# Patient Record
Sex: FEMALE | Race: WHITE | URBAN - METROPOLITAN AREA
[De-identification: names, ages, dates, MRNs, and addresses within clinical notes are randomized per-mention and may not be internally consistent; named-entity substitution may affect disease eponyms.]

---

## 2019-10-22 ENCOUNTER — COMPLETE SKIN EXAM (OUTPATIENT)
Dept: URBAN - METROPOLITAN AREA CLINIC 14 | Facility: CLINIC | Age: 69
Setting detail: DERMATOLOGY
End: 2019-10-22

## 2019-10-22 DIAGNOSIS — L82.1 OTHER SEBORRHEIC KERATOSIS: ICD-10-CM

## 2019-10-22 DIAGNOSIS — D04.39 CARCINOMA IN SITU OF SKIN OF OTHER PARTS OF FACE: ICD-10-CM

## 2019-10-22 PROCEDURE — 11102 TANGNTL BX SKIN SINGLE LES: CPT

## 2019-10-22 PROCEDURE — 99203 OFFICE O/P NEW LOW 30 MIN: CPT

## 2019-11-05 ENCOUNTER — MOHS CONSULT (OUTPATIENT)
Dept: URBAN - METROPOLITAN AREA CLINIC 14 | Facility: CLINIC | Age: 69
Setting detail: DERMATOLOGY
End: 2019-11-05

## 2019-11-05 DIAGNOSIS — Z08 ENCOUNTER FOR FOLLOW-UP EXAMINATION AFTER COMPLETED TREATMENT FOR MALIGNANT NEOPLASM: ICD-10-CM

## 2019-11-05 PROCEDURE — 99213 OFFICE O/P EST LOW 20 MIN: CPT

## 2019-12-10 ENCOUNTER — MOHS SURGERY-ROUTINE (OUTPATIENT)
Dept: URBAN - METROPOLITAN AREA CLINIC 14 | Facility: CLINIC | Age: 69
Setting detail: DERMATOLOGY
End: 2019-12-10

## 2019-12-10 DIAGNOSIS — L57.0 ACTINIC KERATOSIS: ICD-10-CM

## 2019-12-10 PROCEDURE — 13132 CMPLX RPR F/C/C/M/N/AX/G/H/F: CPT

## 2019-12-10 PROCEDURE — 17311 MOHS 1 STAGE H/N/HF/G: CPT

## 2022-08-03 ENCOUNTER — TELEPHONE (OUTPATIENT)
Dept: TRANSPLANT | Facility: CLINIC | Age: 72
End: 2022-08-03

## 2022-08-03 NOTE — TELEPHONE ENCOUNTER
Received call from patients daughter Judson seeking appt for liver transplant. She stated pt lives in [place] but will be living in  with her as she needs care. Daughter has a provider in [place] she still considers her (daughter) doctor and paid for mother to be seen there.  Pt travelled to [place] to see this doctor who then referred her to us. He only saw the pt once and at the request of the daughter.  He did not have any medical records to send, only stating the daughter has all the records. Per daughter pt with Hx of hep c. She reports seen by [facility] <10 yrs ago. Stated mother was not getting good care in [place]. She is bringing her to live with her in .      Review of records with [facility] records [id number]. Alcohol abuse documented during txp evaluation there. 1/2017 seen and it is documented: Patient seeking  services today for symptoms of withdrawal from detox and pain medications.     Pending additional information from pts daughter

## 2022-08-04 ENCOUNTER — HOSPITAL ENCOUNTER (OUTPATIENT)
Facility: OTHER | Age: 72
Discharge: HOME | End: 2022-08-06
Attending: EMERGENCY MEDICINE | Admitting: EMERGENCY MEDICINE
Payer: MEDICARE

## 2022-08-04 DIAGNOSIS — R07.9 CHEST PAIN: ICD-10-CM

## 2022-08-04 DIAGNOSIS — R06.02 SHORTNESS OF BREATH: ICD-10-CM

## 2022-08-04 DIAGNOSIS — B19.20 HEPATITIS C VIRUS INFECTION WITHOUT HEPATIC COMA, UNSPECIFIED CHRONICITY: ICD-10-CM

## 2022-08-04 DIAGNOSIS — K74.60 HEPATIC CIRRHOSIS, UNSPECIFIED HEPATIC CIRRHOSIS TYPE, UNSPECIFIED WHETHER ASCITES PRESENT: ICD-10-CM

## 2022-08-04 DIAGNOSIS — I63.9 STROKE: ICD-10-CM

## 2022-08-04 DIAGNOSIS — R41.82 ALTERED MENTAL STATUS: Primary | ICD-10-CM

## 2022-08-04 DIAGNOSIS — K44.9 HIATAL HERNIA: ICD-10-CM

## 2022-08-04 LAB
ALBUMIN SERPL BCP-MCNC: 3.2 G/DL (ref 3.5–5.2)
ALP SERPL-CCNC: 93 U/L (ref 55–135)
ALT SERPL W/O P-5'-P-CCNC: 22 U/L (ref 10–44)
AMMONIA PLAS-SCNC: 27 UMOL/L (ref 10–50)
AMPHET+METHAMPHET UR QL: ABNORMAL
ANION GAP SERPL CALC-SCNC: 8 MMOL/L (ref 8–16)
APTT BLDCRRT: 24.8 SEC (ref 21–32)
AST SERPL-CCNC: 34 U/L (ref 10–40)
BARBITURATES UR QL SCN>200 NG/ML: NEGATIVE
BASOPHILS # BLD AUTO: 0.03 K/UL (ref 0–0.2)
BASOPHILS NFR BLD: 0.6 % (ref 0–1.9)
BENZODIAZ UR QL SCN>200 NG/ML: NEGATIVE
BILIRUB SERPL-MCNC: 1.2 MG/DL (ref 0.1–1)
BILIRUB UR QL STRIP: NEGATIVE
BNP SERPL-MCNC: 13 PG/ML (ref 0–99)
BUN SERPL-MCNC: 17 MG/DL (ref 8–23)
BZE UR QL SCN: NEGATIVE
CALCIUM SERPL-MCNC: 8.7 MG/DL (ref 8.7–10.5)
CANNABINOIDS UR QL SCN: ABNORMAL
CHLORIDE SERPL-SCNC: 105 MMOL/L (ref 95–110)
CLARITY UR: CLEAR
CO2 SERPL-SCNC: 26 MMOL/L (ref 23–29)
COLOR UR: YELLOW
CREAT SERPL-MCNC: 0.9 MG/DL (ref 0.5–1.4)
CREAT SERPL-MCNC: 1 MG/DL (ref 0.5–1.4)
CREAT UR-MCNC: 105.3 MG/DL (ref 15–325)
CTP QC/QA: YES
D DIMER PPP IA.FEU-MCNC: 1.61 MG/L FEU
DIFFERENTIAL METHOD: ABNORMAL
EOSINOPHIL # BLD AUTO: 0.1 K/UL (ref 0–0.5)
EOSINOPHIL NFR BLD: 1.3 % (ref 0–8)
ERYTHROCYTE [DISTWIDTH] IN BLOOD BY AUTOMATED COUNT: 18.8 % (ref 11.5–14.5)
EST. GFR  (NO RACE VARIABLE): >60 ML/MIN/1.73 M^2
GLUCOSE SERPL-MCNC: 157 MG/DL (ref 70–110)
GLUCOSE UR QL STRIP: NEGATIVE
HCT VFR BLD AUTO: 34.1 % (ref 37–48.5)
HGB BLD-MCNC: 10.1 G/DL (ref 12–16)
HGB UR QL STRIP: NEGATIVE
IMM GRANULOCYTES # BLD AUTO: 0.02 K/UL (ref 0–0.04)
IMM GRANULOCYTES NFR BLD AUTO: 0.4 % (ref 0–0.5)
INR PPP: 1.2 (ref 0.8–1.2)
INR PPP: 1.2 (ref 0.8–1.2)
KETONES UR QL STRIP: NEGATIVE
LEUKOCYTE ESTERASE UR QL STRIP: NEGATIVE
LYMPHOCYTES # BLD AUTO: 1.1 K/UL (ref 1–4.8)
LYMPHOCYTES NFR BLD: 21.3 % (ref 18–48)
MCH RBC QN AUTO: 24.2 PG (ref 27–31)
MCHC RBC AUTO-ENTMCNC: 29.6 G/DL (ref 32–36)
MCV RBC AUTO: 82 FL (ref 82–98)
METHADONE UR QL SCN>300 NG/ML: NEGATIVE
MONOCYTES # BLD AUTO: 0.4 K/UL (ref 0.3–1)
MONOCYTES NFR BLD: 7.2 % (ref 4–15)
NEUTROPHILS # BLD AUTO: 3.7 K/UL (ref 1.8–7.7)
NEUTROPHILS NFR BLD: 69.2 % (ref 38–73)
NITRITE UR QL STRIP: NEGATIVE
NRBC BLD-RTO: 0 /100 WBC
OPIATES UR QL SCN: ABNORMAL
PCP UR QL SCN>25 NG/ML: NEGATIVE
PH UR STRIP: 8 [PH] (ref 5–8)
PLATELET # BLD AUTO: 93 K/UL (ref 150–450)
PMV BLD AUTO: 10.6 FL (ref 9.2–12.9)
POC PTINR: 1 (ref 0.9–1.2)
POC PTWBT: 12.4 SEC (ref 9.7–14.3)
POTASSIUM SERPL-SCNC: 5.1 MMOL/L (ref 3.5–5.1)
PROT SERPL-MCNC: 6.3 G/DL (ref 6–8.4)
PROT UR QL STRIP: NEGATIVE
PROTHROMBIN TIME: 12.7 SEC (ref 9–12.5)
PROTHROMBIN TIME: 12.8 SEC (ref 9–12.5)
RBC # BLD AUTO: 4.18 M/UL (ref 4–5.4)
SAMPLE: NORMAL
SAMPLE: NORMAL
SARS-COV-2 RDRP RESP QL NAA+PROBE: NEGATIVE
SODIUM SERPL-SCNC: 139 MMOL/L (ref 136–145)
SP GR UR STRIP: <=1.005 (ref 1–1.03)
TOXICOLOGY INFORMATION: ABNORMAL
TROPONIN I SERPL DL<=0.01 NG/ML-MCNC: <0.006 NG/ML (ref 0–0.03)
TROPONIN I SERPL DL<=0.01 NG/ML-MCNC: <0.006 NG/ML (ref 0–0.03)
TSH SERPL DL<=0.005 MIU/L-ACNC: 0.81 UIU/ML (ref 0.4–4)
URN SPEC COLLECT METH UR: ABNORMAL
UROBILINOGEN UR STRIP-ACNC: NEGATIVE EU/DL
WBC # BLD AUTO: 5.3 K/UL (ref 3.9–12.7)

## 2022-08-04 PROCEDURE — 84443 ASSAY THYROID STIM HORMONE: CPT | Performed by: NURSE PRACTITIONER

## 2022-08-04 PROCEDURE — 93010 EKG 12-LEAD: ICD-10-PCS | Mod: ,,, | Performed by: INTERNAL MEDICINE

## 2022-08-04 PROCEDURE — 85025 COMPLETE CBC W/AUTO DIFF WBC: CPT | Performed by: NURSE PRACTITIONER

## 2022-08-04 PROCEDURE — 81003 URINALYSIS AUTO W/O SCOPE: CPT | Mod: 59 | Performed by: NURSE PRACTITIONER

## 2022-08-04 PROCEDURE — 85730 THROMBOPLASTIN TIME PARTIAL: CPT | Performed by: NURSE PRACTITIONER

## 2022-08-04 PROCEDURE — 93010 ELECTROCARDIOGRAM REPORT: CPT | Mod: ,,, | Performed by: INTERNAL MEDICINE

## 2022-08-04 PROCEDURE — 99285 EMERGENCY DEPT VISIT HI MDM: CPT | Mod: 25,CS

## 2022-08-04 PROCEDURE — 82565 ASSAY OF CREATININE: CPT

## 2022-08-04 PROCEDURE — U0002 COVID-19 LAB TEST NON-CDC: HCPCS | Performed by: NURSE PRACTITIONER

## 2022-08-04 PROCEDURE — 80061 LIPID PANEL: CPT | Performed by: NURSE PRACTITIONER

## 2022-08-04 PROCEDURE — 85610 PROTHROMBIN TIME: CPT | Mod: 59

## 2022-08-04 PROCEDURE — G0378 HOSPITAL OBSERVATION PER HR: HCPCS

## 2022-08-04 PROCEDURE — 82962 GLUCOSE BLOOD TEST: CPT

## 2022-08-04 PROCEDURE — 83880 ASSAY OF NATRIURETIC PEPTIDE: CPT | Performed by: NURSE PRACTITIONER

## 2022-08-04 PROCEDURE — 96361 HYDRATE IV INFUSION ADD-ON: CPT

## 2022-08-04 PROCEDURE — 93005 ELECTROCARDIOGRAM TRACING: CPT

## 2022-08-04 PROCEDURE — 36415 COLL VENOUS BLD VENIPUNCTURE: CPT | Performed by: NURSE PRACTITIONER

## 2022-08-04 PROCEDURE — 80053 COMPREHEN METABOLIC PANEL: CPT | Performed by: NURSE PRACTITIONER

## 2022-08-04 PROCEDURE — 99900035 HC TECH TIME PER 15 MIN (STAT)

## 2022-08-04 PROCEDURE — 82140 ASSAY OF AMMONIA: CPT | Performed by: NURSE PRACTITIONER

## 2022-08-04 PROCEDURE — 25500020 PHARM REV CODE 255: Performed by: EMERGENCY MEDICINE

## 2022-08-04 PROCEDURE — G0425 PR INPT TELEHEALTH CONSULT 30M: ICD-10-PCS | Mod: 95,ICN,CMP, | Performed by: PSYCHIATRY & NEUROLOGY

## 2022-08-04 PROCEDURE — 85610 PROTHROMBIN TIME: CPT | Mod: 91 | Performed by: NURSE PRACTITIONER

## 2022-08-04 PROCEDURE — 85379 FIBRIN DEGRADATION QUANT: CPT | Performed by: NURSE PRACTITIONER

## 2022-08-04 PROCEDURE — G0425 INPT/ED TELECONSULT30: HCPCS | Mod: 95,ICN,CMP, | Performed by: PSYCHIATRY & NEUROLOGY

## 2022-08-04 PROCEDURE — 80307 DRUG TEST PRSMV CHEM ANLYZR: CPT | Performed by: NURSE PRACTITIONER

## 2022-08-04 PROCEDURE — 25000003 PHARM REV CODE 250: Performed by: NURSE PRACTITIONER

## 2022-08-04 PROCEDURE — 84484 ASSAY OF TROPONIN QUANT: CPT | Performed by: NURSE PRACTITIONER

## 2022-08-04 RX ORDER — MAG HYDROX/ALUMINUM HYD/SIMETH 200-200-20
30 SUSPENSION, ORAL (FINAL DOSE FORM) ORAL ONCE
Status: COMPLETED | OUTPATIENT
Start: 2022-08-04 | End: 2022-08-04

## 2022-08-04 RX ORDER — LIDOCAINE HYDROCHLORIDE 20 MG/ML
10 SOLUTION OROPHARYNGEAL ONCE
Status: COMPLETED | OUTPATIENT
Start: 2022-08-04 | End: 2022-08-04

## 2022-08-04 RX ORDER — HEPARIN SODIUM,PORCINE/D5W 25000/250
0-40 INTRAVENOUS SOLUTION INTRAVENOUS CONTINUOUS
Status: DISCONTINUED | OUTPATIENT
Start: 2022-08-04 | End: 2022-08-05

## 2022-08-04 RX ORDER — ASPIRIN 325 MG
325 TABLET ORAL
Status: COMPLETED | OUTPATIENT
Start: 2022-08-04 | End: 2022-08-04

## 2022-08-04 RX ADMIN — LIDOCAINE HYDROCHLORIDE 10 ML: 20 SOLUTION ORAL; TOPICAL at 10:08

## 2022-08-04 RX ADMIN — IOHEXOL 100 ML: 350 INJECTION, SOLUTION INTRAVENOUS at 07:08

## 2022-08-04 RX ADMIN — ASPIRIN 325 MG ORAL TABLET 325 MG: 325 PILL ORAL at 08:08

## 2022-08-04 RX ADMIN — ALUMINUM HYDROXIDE, MAGNESIUM HYDROXIDE, DIMETHICONE 30 ML: 200; 200; 20 SUSPENSION ORAL at 10:08

## 2022-08-04 RX ADMIN — SODIUM CHLORIDE 1000 ML: 0.9 INJECTION, SOLUTION INTRAVENOUS at 10:08

## 2022-08-04 NOTE — ED PROVIDER NOTES
Encounter Date: 8/4/2022       History     Chief Complaint   Patient presents with    Altered Mental Status     Family member states pt is altered. Hx of liver diease. Pt was at [facility] yesterday for acid reflex and was told it is most likely a hiatal hernia. Family member states pt has been tired and weak today but within the last 1-2 hrs pt became confused. Clear speech and speaking in full sentences. Pt is disoriented to month but able to answer other questions appropriately. Slow to respond appears confused. No droop noted. Normal strength to extremities x 4.     Weakness      Pt appears lethargic. Family member states pt has a prescription for trazodone and valium. Pt does know remember what pills she took.    Chest Pain    Shortness of Breath    Headache     Pt c/o posterior headache.       Chief complaint:  Altered mental status    72-year-old female presents for evaluation of altered mental status.  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  Patient is currently staying with her daughter.  Daughter reports that at approximately 4:00 p.m., acute onset of altered mental status with slurred speech.  Generalized weakness and fatigue.  No facial droop.  No unilateral weakness.  Patient continues to exhibit confusion with slurred speech.  Family members concerned that she may have accidentally taken prescription medication that can contribute to her symptoms, but patient denies taking any medications at the time of acute onset of AMS.    Additionally, patient reports continued chest pain, shortness of breath, upper abdominal pain since last night.  She was seen at outside facility and diagnosed with GERD with a hiatal hernia.    This is the extent of patient's complaints for this ER encounter.     The history is provided by the patient and a relative.     Review of patient's allergies indicates:   Allergen Reactions    Compazine [prochlorperazine] Nausea And  Vomiting     No past medical history on file.  No past surgical history on file.  No family history on file.     Review of Systems   Constitutional: Positive for fatigue. Negative for fever.   HENT: Negative for congestion and sore throat.    Respiratory: Positive for cough and shortness of breath.    Cardiovascular: Positive for chest pain.   Gastrointestinal: Positive for abdominal pain. Negative for diarrhea, nausea and vomiting.   Genitourinary: Negative for difficulty urinating and dysuria.   Musculoskeletal: Negative for arthralgias, back pain, myalgias and neck pain.   Skin: Negative for rash and wound.   Neurological: Positive for speech difficulty, weakness and headaches. Negative for facial asymmetry.   Psychiatric/Behavioral: Negative.        Physical Exam     Initial Vitals [08/04/22 1808]   BP Pulse Resp Temp SpO2   111/65 (!) 119 18 99 °F (37.2 °C) 95 %      MAP       --         Physical Exam    Vitals reviewed.  Constitutional: No distress.   HENT:   Head: Normocephalic and atraumatic.   Nose: Nose normal.   Mouth/Throat: Oropharynx is clear and moist and mucous membranes are normal.   Eyes: Conjunctivae, EOM and lids are normal. Right pupil is round. Left pupil is round. Pupils are equal.   Cardiovascular: Regular rhythm and normal heart sounds. Tachycardia present.    Pulmonary/Chest: Effort normal and breath sounds normal. No respiratory distress.   Musculoskeletal:         General: Normal range of motion.     Neurological: She is alert. She has normal strength. No sensory deficit. GCS eye subscore is 4. GCS verbal subscore is 4. GCS motor subscore is 6.   Skin: Skin is warm and dry. No rash noted.   Psychiatric: Her speech is slurred.         ED Course   Procedures  Labs Reviewed   CBC W/ AUTO DIFFERENTIAL - Abnormal; Notable for the following components:       Result Value    Hemoglobin 10.1 (*)     Hematocrit 34.1 (*)     MCH 24.2 (*)     MCHC 29.6 (*)     RDW 18.8 (*)     Platelets 93 (*)      All other components within normal limits   COMPREHENSIVE METABOLIC PANEL - Abnormal; Notable for the following components:    Glucose 157 (*)     Albumin 3.2 (*)     Total Bilirubin 1.2 (*)     All other components within normal limits   PROTIME-INR - Abnormal; Notable for the following components:    Prothrombin Time 12.8 (*)     All other components within normal limits   TSH   TROPONIN I   B-TYPE NATRIURETIC PEPTIDE   AMMONIA   LIPID PANEL   URINALYSIS, REFLEX TO URINE CULTURE   DRUG SCREEN PANEL, URINE EMERGENCY   D DIMER, QUANTITATIVE   TROPONIN I   SARS-COV-2 RDRP GENE    Narrative:     This test utilizes isothermal nucleic acid amplification   technology to detect the SARS-CoV-2 RdRp nucleic acid segment.   The analytical sensitivity (limit of detection) is 125 genome   equivalents/mL.   A POSITIVE result implies infection with the SARS-CoV-2 virus;   the patient is presumed to be contagious.     A NEGATIVE result means that SARS-CoV-2 nucleic acids are not   present above the limit of detection. A NEGATIVE result should be   treated as presumptive. It does not rule out the possibility of   COVID-19 and should not be the sole basis for treatment decisions.   If COVID-19 is strongly suspected based on clinical and exposure   history, re-testing using an alternate molecular assay should be   considered.   This test is only for use under the Food and Drug   Administration s Emergency Use Authorization (EUA).   Commercial kits are provided by [employer].   Performance characteristics of the EUA have been independently   verified by [facility] Department of   Pathology and Laboratory Medicine.   _________________________________________________________________   The authorized Fact Sheet for Healthcare Providers and the authorized Fact   Sheet for Patients of the ID NOW COVID-19 are available on the FDA   website:      https://www.fda.gov/media/086138/download  https://www.fda.gov/media/563374/download          POCT GLUCOSE, HAND-HELD DEVICE   ISTAT PROCEDURE   ISTAT CREATININE          Imaging Results          MRI Brain Without Contrast (In process)                CTA Head and Neck (xpd) (Final result)  Result time 08/04/22 19:43:53   Procedure changed from CTA Brain     Final result by Farhan Woodson MD (08/04/22 19:43:53)                 Impression:      No acute abnormality. No high-grade stenosis or major vessel occlusion.      Electronically signed by: Farhan Woodson  Date:    08/04/2022  Time:    19:43             Narrative:    EXAMINATION:  CTA HEAD AND NECK (XPD)    CLINICAL HISTORY:  Neuro deficit, acute, stroke suspected;stroke code;    TECHNIQUE:  CT angiogram was performed from the level of the angie to the top of the head following the IV administration of 100mL of Omnipaque 350.   Sagittal and coronal reconstructions and maximum intensity projection reconstructions were performed. Arterial stenosis percentages are based on NASCET measurement criteria.  Rapid AI was used for the exam.    COMPARISON:  CT brain, same day at 18:50 hours    FINDINGS:  Intracranial Compartment:    Brain parenchyma appears stable with no abnormal enhancement or new low-density in spite of IV contrast administration.    Aorta: Normal 3 vessel arch.    Extracranial carotid circulation: No hemodynamically significant stenosis, aneurysmal dilatation, or dissection.    Extracranial vertebral circulation: No hemodynamically significant stenosis, aneurysmal dilatation, or dissection.    Intracranial Arteries: No focal high-grade stenosis, occlusion, or aneurysm.  A1 segment appears absent or hypoplastic on the right with both anterior cerebral arteries filling from the left prominent left A1 segment.    Venous structures (limited evaluation): Normal.    Fluid-filled esophagus is noted in the upper chest.  Multilevel ACDF with plate and  cervical spondylosis is noted.                               X-Ray Chest AP Portable (Final result)  Result time 08/04/22 19:25:31    Final result by Sandrine Pablo MD (08/04/22 19:25:31)                 Impression:      No focal consolidation.  Mild blunting of the left costophrenic angle, which may suggest small left pleural effusion and/or pleural thickening.    Retrocardiac mass.  Question moderate hiatal hernia.  Etiology cannot be entirely excluded.      Electronically signed by: Sandrine Pablo  Date:    08/04/2022  Time:    19:25             Narrative:    EXAMINATION:  AP PORTABLE CHEST    CLINICAL HISTORY:  Stroke;    TECHNIQUE:  AP portable chest radiograph was submitted.    COMPARISON:  None.    FINDINGS:  AP portable chest radiograph demonstrates a cardiac silhouette within normal limits.  There is blunting of the left costophrenic angle.  There is no focal consolidation or pneumothorax.  There is a retrocardiac mass.  An anterior cervical screw plate devices are present.                               CT Head Without Contrast (Final result)  Result time 08/04/22 19:04:07    Final result by Sandrine Pablo MD (08/04/22 19:04:07)                 Impression:      No acute intracranial abnormality detected.  If persistent neurological abnormality, recommend MRI of the brain with diffusion-weighted sequencing.      Electronically signed by: Sandrine Pablo  Date:    08/04/2022  Time:    19:04             Narrative:    EXAMINATION:  CT OF THE HEAD WITHOUT    CLINICAL HISTORY:  Trouble walking this morning and weakness.  Trouble remembering things.    TECHNIQUE:  5 mm unenhanced axial images were obtained from the skull base to the vertex.    COMPARISON:  None.    FINDINGS:  Mild cerebral atrophy and moderate chronic small vessel ischemic changes.  There is no acute intracranial hemorrhage, territorial infarct or mass effect, or midline shift. The visualized paranasal sinuses and mastoid air cells are  "clear.  There is hyperostosis frontalis.                                 Medications   sodium chloride 0.9% bolus 1,000 mL (has no administration in time range)   aluminum-magnesium hydroxide-simethicone 200-200-20 mg/5 mL suspension 30 mL (has no administration in time range)     And   LIDOcaine HCl 2% oral solution 10 mL (has no administration in time range)   iohexoL (OMNIPAQUE 350) injection 100 mL (100 mLs Intravenous Given 8/4/22 1902)   aspirin tablet 325 mg (325 mg Oral Given 8/4/22 2002)     Medical Decision Making:   Initial Assessment:   72-year-old female presents for evaluation of acute onset altered mental status with slurred speech.  Differential Diagnosis:   Stroke, liver disease, UTI, electrolyte abnormality, drug use  Clinical Tests:   Lab Tests: Ordered  Radiological Study: Ordered  Medical Tests: Ordered  ED Management:  Patient's care was immediately discussed with collaborating provider.  Stroke code initiated.    Refer to patient course below for additional management.              ED Course as of 08/04/22 2146   Thu Aug 04, 2022   1857 POC Creatinine: 0.9 [EW]   1857 POC PTINR: 1.0 [EW]   1906 Hemoglobin(!): 10.1 [EW]   1906 Hematocrit(!): 34.1  Family member reports anemia.  Recent blood transfusion.  Daughter reports last hemoglobin was 10.5.  No active bleeding. [EW]   1907 CT Head Without Contrast  CT read per Radiology:  No acute findings. [EW]   1929 Troponin I: <0.006 [EW]   1929 Ammonia: 27 [EW]   1955 BNP: 13 [EW]   1955 TSH: 0.805 [EW]   1955 EKG:  Sinus tachycardia.  Heart rate 114. No STEMI.  No priors for comparison. [EW]   1956 X-Ray Chest AP Portable  "No focal consolidation.  Mild blunting of the left costophrenic angle, which may suggest small left pleural effusion and/or pleural thickening.     Retrocardiac mass.  Question moderate hiatal hernia.  Etiology cannot be entirely excluded." [EW]   1956 CTA Head and Neck (xpd)  Read per Radiology:  No acute findings. [EW]   2144 " Pulse(!): 115 [EW]   2144 SpO2(!): 94 % [EW]   2144 Given vital signs, and patient's reported chest pain or shortness of breath, D-dimer repeat troponin ordered.    Discussed patient's care with Hospital Medicine for admission.  Collaborating provider will assume care while additional workup is pending. [EW]      ED Course User Index  [EW] Bette Sharma NP             Clinical Impression:   Final diagnoses:  [I63.9] Stroke                 Bette Sharma NP  08/04/22 214

## 2022-08-04 NOTE — ED TRIAGE NOTES
"Pt presentns to the ED w/ c/o trouble walking this morning and weakness. Pt also reporting trouble remembering things. Pt's daughter states "it's almost as if she was drugged. I wonder if she took a medication and didn't remember." Pt's daughter also reporting pt has been fatigue lately. Pt reporting poor appetite. Pt endorsing epigastric pain and SOB. Pt seen at [facility] ER last PM and diagnosed with hiatal hernia. Face symmetrical. Equal  strength noted. No limb drift. Pt states she has more feeling in L side than she does in her R side.    "

## 2022-08-05 PROBLEM — K21.9 GERD (GASTROESOPHAGEAL REFLUX DISEASE): Status: ACTIVE | Noted: 2022-08-05

## 2022-08-05 PROBLEM — R41.82 ALTERED MENTAL STATUS: Status: ACTIVE | Noted: 2022-08-05

## 2022-08-05 PROBLEM — K44.9 HIATAL HERNIA: Status: ACTIVE | Noted: 2022-08-05

## 2022-08-05 LAB
ALBUMIN SERPL BCP-MCNC: 2.9 G/DL (ref 3.5–5.2)
ALP SERPL-CCNC: 98 U/L (ref 55–135)
ALT SERPL W/O P-5'-P-CCNC: 23 U/L (ref 10–44)
ANION GAP SERPL CALC-SCNC: 11 MMOL/L (ref 8–16)
APTT BLDCRRT: >150 SEC (ref 21–32)
ASCENDING AORTA: 2.4 CM
AST SERPL-CCNC: 33 U/L (ref 10–40)
AV INDEX (PROSTH): 0.98
AV MEAN GRADIENT: 3 MMHG
AV PEAK GRADIENT: 6 MMHG
AV VALVE AREA: 3.06 CM2
AV VELOCITY RATIO: 0.86
BASOPHILS # BLD AUTO: 0.02 K/UL (ref 0–0.2)
BASOPHILS NFR BLD: 0.4 % (ref 0–1.9)
BILIRUB SERPL-MCNC: 0.8 MG/DL (ref 0.1–1)
BSA FOR ECHO PROCEDURE: 1.76 M2
BUN SERPL-MCNC: 13 MG/DL (ref 8–23)
CALCIUM SERPL-MCNC: 8 MG/DL (ref 8.7–10.5)
CHLORIDE SERPL-SCNC: 107 MMOL/L (ref 95–110)
CHOLEST SERPL-MCNC: 123 MG/DL (ref 120–199)
CHOLEST/HDLC SERPL: 3.2 {RATIO} (ref 2–5)
CK MB SERPL-MCNC: 1 NG/ML (ref 0.1–6.5)
CK MB SERPL-RTO: 2 % (ref 0–5)
CK SERPL-CCNC: 51 U/L (ref 20–180)
CO2 SERPL-SCNC: 20 MMOL/L (ref 23–29)
CREAT SERPL-MCNC: 0.8 MG/DL (ref 0.5–1.4)
CV ECHO LV RWT: 0.3 CM
DIFFERENTIAL METHOD: ABNORMAL
DOP CALC AO PEAK VEL: 1.24 M/S
DOP CALC AO VTI: 28.62 CM
DOP CALC LVOT AREA: 3.1 CM2
DOP CALC LVOT DIAMETER: 1.99 CM
DOP CALC LVOT PEAK VEL: 1.07 M/S
DOP CALC LVOT STROKE VOLUME: 87.6 CM3
DOP CALCLVOT PEAK VEL VTI: 28.18 CM
E WAVE DECELERATION TIME: 140.97 MSEC
E/A RATIO: 1.07
E/E' RATIO: 9.18 M/S
ECHO LV POSTERIOR WALL: 0.72 CM (ref 0.6–1.1)
EJECTION FRACTION: 70 %
EOSINOPHIL # BLD AUTO: 0.2 K/UL (ref 0–0.5)
EOSINOPHIL NFR BLD: 3.5 % (ref 0–8)
ERYTHROCYTE [DISTWIDTH] IN BLOOD BY AUTOMATED COUNT: 18.6 % (ref 11.5–14.5)
EST. GFR  (NO RACE VARIABLE): >60 ML/MIN/1.73 M^2
ESTIMATED AVG GLUCOSE: 117 MG/DL (ref 68–131)
FRACTIONAL SHORTENING: 47 % (ref 28–44)
GLUCOSE SERPL-MCNC: 154 MG/DL (ref 70–110)
HBA1C MFR BLD: 5.7 % (ref 4–5.6)
HCT VFR BLD AUTO: 31.7 % (ref 37–48.5)
HDLC SERPL-MCNC: 39 MG/DL (ref 40–75)
HDLC SERPL: 31.7 % (ref 20–50)
HGB BLD-MCNC: 9.3 G/DL (ref 12–16)
IMM GRANULOCYTES # BLD AUTO: 0.02 K/UL (ref 0–0.04)
IMM GRANULOCYTES NFR BLD AUTO: 0.4 % (ref 0–0.5)
INTERVENTRICULAR SEPTUM: 0.67 CM (ref 0.6–1.1)
IVRT: 68.51 MSEC
LA MAJOR: 4.78 CM
LA MINOR: 4.69 CM
LA WIDTH: 4.21 CM
LDLC SERPL CALC-MCNC: 73.2 MG/DL (ref 63–159)
LEFT ATRIUM SIZE: 2.83 CM
LEFT ATRIUM VOLUME INDEX MOD: 34.3 ML/M2
LEFT ATRIUM VOLUME INDEX: 27.9 ML/M2
LEFT ATRIUM VOLUME MOD: 59 CM3
LEFT ATRIUM VOLUME: 47.95 CM3
LEFT INTERNAL DIMENSION IN SYSTOLE: 2.51 CM (ref 2.1–4)
LEFT VENTRICLE DIASTOLIC VOLUME INDEX: 61.73 ML/M2
LEFT VENTRICLE DIASTOLIC VOLUME: 106.17 ML
LEFT VENTRICLE MASS INDEX: 61 G/M2
LEFT VENTRICLE SYSTOLIC VOLUME INDEX: 13.1 ML/M2
LEFT VENTRICLE SYSTOLIC VOLUME: 22.57 ML
LEFT VENTRICULAR INTERNAL DIMENSION IN DIASTOLE: 4.77 CM (ref 3.5–6)
LEFT VENTRICULAR MASS: 104.78 G
LV LATERAL E/E' RATIO: 8.67 M/S
LV SEPTAL E/E' RATIO: 9.75 M/S
LYMPHOCYTES # BLD AUTO: 1.5 K/UL (ref 1–4.8)
LYMPHOCYTES NFR BLD: 28 % (ref 18–48)
MCH RBC QN AUTO: 24.2 PG (ref 27–31)
MCHC RBC AUTO-ENTMCNC: 29.3 G/DL (ref 32–36)
MCV RBC AUTO: 82 FL (ref 82–98)
MONOCYTES # BLD AUTO: 0.4 K/UL (ref 0.3–1)
MONOCYTES NFR BLD: 7.1 % (ref 4–15)
MV PEAK A VEL: 0.73 M/S
MV PEAK E VEL: 0.78 M/S
MV STENOSIS PRESSURE HALF TIME: 41.14 MS
MV VALVE AREA P 1/2 METHOD: 5.35 CM2
NEUTROPHILS # BLD AUTO: 3.3 K/UL (ref 1.8–7.7)
NEUTROPHILS NFR BLD: 60.6 % (ref 38–73)
NONHDLC SERPL-MCNC: 84 MG/DL
NRBC BLD-RTO: 0 /100 WBC
PISA MRMAX VEL: 0.06 M/S
PISA TR MAX VEL: 2.36 M/S
PLATELET # BLD AUTO: 71 K/UL (ref 150–450)
PMV BLD AUTO: 10.4 FL (ref 9.2–12.9)
POTASSIUM SERPL-SCNC: 4.6 MMOL/L (ref 3.5–5.1)
PROT SERPL-MCNC: 5.8 G/DL (ref 6–8.4)
PV PEAK S VEL: 0.83 M/S
PV PEAK VELOCITY: 0.69 CM/S
RA MAJOR: 4.26 CM
RA PRESSURE: 3 MMHG
RA WIDTH: 3.2 CM
RBC # BLD AUTO: 3.85 M/UL (ref 4–5.4)
SINUS: 2.99 CM
SODIUM SERPL-SCNC: 138 MMOL/L (ref 136–145)
STJ: 2.59 CM
TDI LATERAL: 0.09 M/S
TDI SEPTAL: 0.08 M/S
TDI: 0.09 M/S
TR MAX PG: 22 MMHG
TRICUSPID ANNULAR PLANE SYSTOLIC EXCURSION: 1.87 CM
TRIGL SERPL-MCNC: 54 MG/DL (ref 30–150)
TROPONIN I SERPL DL<=0.01 NG/ML-MCNC: <0.006 NG/ML (ref 0–0.03)
TV REST PULMONARY ARTERY PRESSURE: 25 MMHG
WBC # BLD AUTO: 5.46 K/UL (ref 3.9–12.7)

## 2022-08-05 PROCEDURE — 36415 COLL VENOUS BLD VENIPUNCTURE: CPT | Performed by: INTERNAL MEDICINE

## 2022-08-05 PROCEDURE — 85025 COMPLETE CBC W/AUTO DIFF WBC: CPT | Performed by: EMERGENCY MEDICINE

## 2022-08-05 PROCEDURE — 25500020 PHARM REV CODE 255: Performed by: INTERNAL MEDICINE

## 2022-08-05 PROCEDURE — 36415 COLL VENOUS BLD VENIPUNCTURE: CPT | Performed by: PHYSICIAN ASSISTANT

## 2022-08-05 PROCEDURE — 96366 THER/PROPH/DIAG IV INF ADDON: CPT

## 2022-08-05 PROCEDURE — 63600175 PHARM REV CODE 636 W HCPCS: Performed by: PHYSICIAN ASSISTANT

## 2022-08-05 PROCEDURE — G0378 HOSPITAL OBSERVATION PER HR: HCPCS

## 2022-08-05 PROCEDURE — 25000003 PHARM REV CODE 250: Performed by: INTERNAL MEDICINE

## 2022-08-05 PROCEDURE — 63600175 PHARM REV CODE 636 W HCPCS: Performed by: EMERGENCY MEDICINE

## 2022-08-05 PROCEDURE — 96375 TX/PRO/DX INJ NEW DRUG ADDON: CPT | Mod: 59

## 2022-08-05 PROCEDURE — 92610 EVALUATE SWALLOWING FUNCTION: CPT

## 2022-08-05 PROCEDURE — 94761 N-INVAS EAR/PLS OXIMETRY MLT: CPT

## 2022-08-05 PROCEDURE — 83036 HEMOGLOBIN GLYCOSYLATED A1C: CPT | Performed by: PHYSICIAN ASSISTANT

## 2022-08-05 PROCEDURE — 85730 THROMBOPLASTIN TIME PARTIAL: CPT | Performed by: EMERGENCY MEDICINE

## 2022-08-05 PROCEDURE — 96365 THER/PROPH/DIAG IV INF INIT: CPT

## 2022-08-05 PROCEDURE — 97165 OT EVAL LOW COMPLEX 30 MIN: CPT

## 2022-08-05 PROCEDURE — 80053 COMPREHEN METABOLIC PANEL: CPT | Performed by: INTERNAL MEDICINE

## 2022-08-05 PROCEDURE — 97161 PT EVAL LOW COMPLEX 20 MIN: CPT

## 2022-08-05 PROCEDURE — 25000003 PHARM REV CODE 250: Performed by: PHYSICIAN ASSISTANT

## 2022-08-05 PROCEDURE — 84484 ASSAY OF TROPONIN QUANT: CPT | Performed by: PHYSICIAN ASSISTANT

## 2022-08-05 PROCEDURE — 99220 PR INITIAL OBSERVATION CARE,LEVL III: ICD-10-PCS | Mod: ,,, | Performed by: PHYSICIAN ASSISTANT

## 2022-08-05 PROCEDURE — 99220 PR INITIAL OBSERVATION CARE,LEVL III: CPT | Mod: ,,, | Performed by: PHYSICIAN ASSISTANT

## 2022-08-05 PROCEDURE — 82553 CREATINE MB FRACTION: CPT | Performed by: PHYSICIAN ASSISTANT

## 2022-08-05 RX ORDER — ATORVASTATIN CALCIUM 20 MG/1
40 TABLET, FILM COATED ORAL DAILY
Status: DISCONTINUED | OUTPATIENT
Start: 2022-08-05 | End: 2022-08-06 | Stop reason: HOSPADM

## 2022-08-05 RX ORDER — LANOLIN ALCOHOL/MO/W.PET/CERES
100 CREAM (GRAM) TOPICAL DAILY
Status: DISCONTINUED | OUTPATIENT
Start: 2022-08-05 | End: 2022-08-06 | Stop reason: HOSPADM

## 2022-08-05 RX ORDER — IBUPROFEN 200 MG
16 TABLET ORAL
Status: DISCONTINUED | OUTPATIENT
Start: 2022-08-05 | End: 2022-08-06 | Stop reason: HOSPADM

## 2022-08-05 RX ORDER — FERROUS SULFATE 325(65) MG
TABLET, DELAYED RELEASE (ENTERIC COATED) ORAL DAILY
COMMUNITY
Start: 2022-03-31

## 2022-08-05 RX ORDER — BISACODYL 10 MG
10 SUPPOSITORY, RECTAL RECTAL DAILY PRN
Status: DISCONTINUED | OUTPATIENT
Start: 2022-08-05 | End: 2022-08-06 | Stop reason: HOSPADM

## 2022-08-05 RX ORDER — LIDOCAINE 50 MG/G
1 PATCH TOPICAL
Status: DISCONTINUED | OUTPATIENT
Start: 2022-08-05 | End: 2022-08-06 | Stop reason: HOSPADM

## 2022-08-05 RX ORDER — FAMOTIDINE 20 MG/1
20 TABLET, FILM COATED ORAL EVERY MORNING
COMMUNITY
Start: 2022-02-15

## 2022-08-05 RX ORDER — NALOXONE HCL 0.4 MG/ML
0.02 VIAL (ML) INJECTION
Status: DISCONTINUED | OUTPATIENT
Start: 2022-08-05 | End: 2022-08-06 | Stop reason: HOSPADM

## 2022-08-05 RX ORDER — GLUCAGON 1 MG
1 KIT INJECTION
Status: DISCONTINUED | OUTPATIENT
Start: 2022-08-05 | End: 2022-08-06 | Stop reason: HOSPADM

## 2022-08-05 RX ORDER — TALC
6 POWDER (GRAM) TOPICAL NIGHTLY PRN
Status: DISCONTINUED | OUTPATIENT
Start: 2022-08-05 | End: 2022-08-06

## 2022-08-05 RX ORDER — MONTELUKAST SODIUM 10 MG/1
10 TABLET ORAL DAILY
COMMUNITY
Start: 2022-06-14 | End: 2022-08-11

## 2022-08-05 RX ORDER — SODIUM CHLORIDE 0.9 % (FLUSH) 0.9 %
5 SYRINGE (ML) INJECTION
Status: DISCONTINUED | OUTPATIENT
Start: 2022-08-05 | End: 2022-08-06 | Stop reason: HOSPADM

## 2022-08-05 RX ORDER — LABETALOL HYDROCHLORIDE 5 MG/ML
10 INJECTION, SOLUTION INTRAVENOUS
Status: DISCONTINUED | OUTPATIENT
Start: 2022-08-05 | End: 2022-08-06 | Stop reason: HOSPADM

## 2022-08-05 RX ORDER — HYDROCORTISONE 25 MG/G
CREAM TOPICAL
COMMUNITY
Start: 2022-03-25

## 2022-08-05 RX ORDER — ONDANSETRON 4 MG/1
4 TABLET, FILM COATED ORAL EVERY 8 HOURS PRN
COMMUNITY
Start: 2022-02-15

## 2022-08-05 RX ORDER — MONTELUKAST SODIUM 10 MG/1
10 TABLET ORAL DAILY
Status: DISCONTINUED | OUTPATIENT
Start: 2022-08-05 | End: 2022-08-06 | Stop reason: HOSPADM

## 2022-08-05 RX ORDER — ASPIRIN 81 MG/1
81 TABLET ORAL DAILY
Status: DISCONTINUED | OUTPATIENT
Start: 2022-08-05 | End: 2022-08-06 | Stop reason: HOSPADM

## 2022-08-05 RX ORDER — ESCITALOPRAM OXALATE 20 MG/1
20 TABLET ORAL EVERY MORNING
COMMUNITY
Start: 2022-02-15 | End: 2022-08-11 | Stop reason: SDUPTHER

## 2022-08-05 RX ORDER — ZOLPIDEM TARTRATE 5 MG/1
5 TABLET ORAL NIGHTLY PRN
COMMUNITY
Start: 2022-07-15 | End: 2022-08-05

## 2022-08-05 RX ORDER — SIMETHICONE 80 MG
1 TABLET,CHEWABLE ORAL 4 TIMES DAILY PRN
Status: DISCONTINUED | OUTPATIENT
Start: 2022-08-05 | End: 2022-08-06 | Stop reason: HOSPADM

## 2022-08-05 RX ORDER — SUCRALFATE 1 G/1
1 TABLET ORAL
COMMUNITY

## 2022-08-05 RX ORDER — ACETAMINOPHEN 500 MG
1000 TABLET ORAL EVERY 8 HOURS PRN
Status: DISCONTINUED | OUTPATIENT
Start: 2022-08-05 | End: 2022-08-06 | Stop reason: HOSPADM

## 2022-08-05 RX ORDER — TRAMADOL HYDROCHLORIDE 50 MG/1
50 TABLET ORAL EVERY 4 HOURS PRN
COMMUNITY
Start: 2022-02-15 | End: 2022-08-11

## 2022-08-05 RX ORDER — SUCRALFATE 1 G/1
1 TABLET ORAL
Status: DISCONTINUED | OUTPATIENT
Start: 2022-08-05 | End: 2022-08-06 | Stop reason: HOSPADM

## 2022-08-05 RX ORDER — SODIUM CHLORIDE 0.9 % (FLUSH) 0.9 %
10 SYRINGE (ML) INJECTION
Status: DISCONTINUED | OUTPATIENT
Start: 2022-08-05 | End: 2022-08-06 | Stop reason: HOSPADM

## 2022-08-05 RX ORDER — ONDANSETRON 8 MG/1
8 TABLET, ORALLY DISINTEGRATING ORAL EVERY 8 HOURS PRN
Status: DISCONTINUED | OUTPATIENT
Start: 2022-08-05 | End: 2022-08-06 | Stop reason: HOSPADM

## 2022-08-05 RX ORDER — LANOLIN ALCOHOL/MO/W.PET/CERES
100 CREAM (GRAM) TOPICAL DAILY
COMMUNITY
Start: 2022-03-28

## 2022-08-05 RX ORDER — PANTOPRAZOLE SODIUM 40 MG/1
40 TABLET, DELAYED RELEASE ORAL 2 TIMES DAILY
COMMUNITY

## 2022-08-05 RX ORDER — ESCITALOPRAM OXALATE 10 MG/1
20 TABLET ORAL EVERY MORNING
Status: DISCONTINUED | OUTPATIENT
Start: 2022-08-05 | End: 2022-08-06 | Stop reason: HOSPADM

## 2022-08-05 RX ORDER — ACETAMINOPHEN 325 MG/1
650 TABLET ORAL EVERY 4 HOURS PRN
Status: DISCONTINUED | OUTPATIENT
Start: 2022-08-05 | End: 2022-08-06 | Stop reason: HOSPADM

## 2022-08-05 RX ORDER — IPRATROPIUM BROMIDE AND ALBUTEROL SULFATE 2.5; .5 MG/3ML; MG/3ML
3 SOLUTION RESPIRATORY (INHALATION) EVERY 4 HOURS PRN
Status: DISCONTINUED | OUTPATIENT
Start: 2022-08-05 | End: 2022-08-06 | Stop reason: HOSPADM

## 2022-08-05 RX ORDER — DEXTROAMPHETAMINE SACCHARATE, AMPHETAMINE ASPARTATE, DEXTROAMPHETAMINE SULFATE AND AMPHETAMINE SULFATE 3.75; 3.75; 3.75; 3.75 MG/1; MG/1; MG/1; MG/1
15 TABLET ORAL 2 TIMES DAILY
COMMUNITY
Start: 2022-07-21

## 2022-08-05 RX ORDER — IBUPROFEN 200 MG
24 TABLET ORAL
Status: DISCONTINUED | OUTPATIENT
Start: 2022-08-05 | End: 2022-08-06 | Stop reason: HOSPADM

## 2022-08-05 RX ORDER — KETOROLAC TROMETHAMINE 30 MG/ML
15 INJECTION, SOLUTION INTRAMUSCULAR; INTRAVENOUS ONCE
Status: COMPLETED | OUTPATIENT
Start: 2022-08-05 | End: 2022-08-05

## 2022-08-05 RX ORDER — POLYETHYLENE GLYCOL 3350 17 G/17G
17 POWDER, FOR SOLUTION ORAL DAILY
Status: DISCONTINUED | OUTPATIENT
Start: 2022-08-05 | End: 2022-08-06 | Stop reason: HOSPADM

## 2022-08-05 RX ORDER — PANTOPRAZOLE SODIUM 40 MG/1
40 TABLET, DELAYED RELEASE ORAL DAILY
Status: DISCONTINUED | OUTPATIENT
Start: 2022-08-05 | End: 2022-08-06 | Stop reason: HOSPADM

## 2022-08-05 RX ORDER — MAG HYDROX/ALUMINUM HYD/SIMETH 200-200-20
30 SUSPENSION, ORAL (FINAL DOSE FORM) ORAL 4 TIMES DAILY PRN
Status: DISCONTINUED | OUTPATIENT
Start: 2022-08-05 | End: 2022-08-06 | Stop reason: HOSPADM

## 2022-08-05 RX ADMIN — LIDOCAINE 1 PATCH: 50 PATCH TOPICAL at 11:08

## 2022-08-05 RX ADMIN — SUCRALFATE 1 G: 1 TABLET ORAL at 09:08

## 2022-08-05 RX ADMIN — SUCRALFATE 1 G: 1 TABLET ORAL at 11:08

## 2022-08-05 RX ADMIN — THERA TABS 1 TABLET: TAB at 09:08

## 2022-08-05 RX ADMIN — IOHEXOL 75 ML: 350 INJECTION, SOLUTION INTRAVENOUS at 10:08

## 2022-08-05 RX ADMIN — ASPIRIN 81 MG: 81 TABLET, COATED ORAL at 09:08

## 2022-08-05 RX ADMIN — HEPARIN SODIUM 17.99 UNITS/KG/HR: 10000 INJECTION, SOLUTION INTRAVENOUS at 01:08

## 2022-08-05 RX ADMIN — MONTELUKAST 10 MG: 10 TABLET, FILM COATED ORAL at 09:08

## 2022-08-05 RX ADMIN — KETOROLAC TROMETHAMINE 15 MG: 30 INJECTION, SOLUTION INTRAMUSCULAR; INTRAVENOUS at 06:08

## 2022-08-05 RX ADMIN — PANTOPRAZOLE SODIUM 40 MG: 40 TABLET, DELAYED RELEASE ORAL at 09:08

## 2022-08-05 RX ADMIN — ESCITALOPRAM OXALATE 20 MG: 10 TABLET ORAL at 09:08

## 2022-08-05 RX ADMIN — ATORVASTATIN CALCIUM 40 MG: 20 TABLET, FILM COATED ORAL at 09:08

## 2022-08-05 RX ADMIN — Medication 100 MG: at 09:08

## 2022-08-05 RX ADMIN — SUCRALFATE 1 G: 1 TABLET ORAL at 05:08

## 2022-08-05 NOTE — PLAN OF CARE
Initial Discharge Planning Assessment:  Patient admitted on: 8/4/22     Chart reviewed, Care plan discussed with treatment team,  attending [facility]     PCP updated in Epic: Patient do not have a PCP  Pharmacy, updated in Epic: Bedside      DME at home:Rollator & walker      Current dispo: Home with family vs Home health       Transportation: Family can provide      Power of  or Living Will: Family      Anticipated DC needs from CM perspective:         08/05/22 1347   Discharge Assessment   Assessment Type Discharge Planning Assessment   Confirmed/corrected address, phone number and insurance Yes   Confirmed Demographics Correct on Facesheet   Source of Information patient;health record   Does patient/caregiver understand observation status Yes   Lives With child(agustin), adult   Do you expect to return to your current living situation? Yes   Do you have help at home or someone to help you manage your care at home? Yes   Who are your caregiver(s) and their phone number(s)? Patient live with daughter   Prior to hospitilization cognitive status: Alert/Oriented   Current cognitive status: Alert/Oriented   Walking or Climbing Stairs Difficulty none   Dressing/Bathing Difficulty none   Equipment Currently Used at Home rollator;cane, straight   Readmission within 30 days? No   Patient currently being followed by outpatient case management? No   Do you currently have service(s) that help you manage your care at home? No   Do you take prescription medications? Yes   Do you have prescription coverage? Yes   Do you have any problems affording any of your prescribed medications? No   Are you on dialysis? No  (patient reported being a diabetic)   Do you take coumadin? No   Discharge Plan A Home with family   Discharge Plan B Home Health   DME Needed Upon Discharge  none   Discharge Plan discussed with: Patient   Discharge Barriers Identified None

## 2022-08-05 NOTE — HPI
"Margret Dykes is a 72F with cirrhosis 2/2 hepatitis, GERD, hiatal hernia who presents for slurred speech and visual hallucinations around 4:00 PM 8/4. She describes the hallucinations as "trippy" like being on "hallucinogenics". At time of my exam her slurred speech has resolved and she denies hallucinations. She does take Ambien to sleep from time to time but says she has not taken Ambien in about 5 days. Her daughter however is concerned that she mismanaged her medications and accidentally took Ambien and Trazodone. Ms. Dykes denies alcohol or drug use. Though her UDS is + for opioids, amphetamines (adderall), THC. She says she received IV pain medications at a different hospital yesterday. She also reports chronic shortness of breath, indigestion  "

## 2022-08-05 NOTE — PT/OT/SLP EVAL
"Physical Therapy Evaluation    Patient Name:  Margret Dykes   MRN:  [id number]    Recommendations:     Discharge Recommendations:  outpatient PT   Discharge Equipment Recommendations:  (TBD pending progress)   Barriers to discharge: functional mobility impairments / medical treatment    Assessment:     Margret Dykes is a 72 y.o. female admitted with a medical diagnosis of Altered mental status. She has a past medical history that includes but is not limited to cirrhosis, ETOH abuse, GI hemorrhage, hepatic encephalopathy and Hep. C. She presents with the following impairments/functional limitations:  weakness, impaired endurance, impaired sensation, impaired self care skills, impaired functional mobility, impaired balance, gait instability, decreased lower extremity function, pain.    PT orders received. PT evaluation completed and goals/POC established. Pt tolerated evaluation well with no adverse reactions. Pt performed in-room ambulation with assistance and no AD; pt with noted stability deficits. PT will continue to follow and progress goals as tolerated. Recommend discharge to outpatient PT.     Rehab Prognosis: Good; patient would benefit from acute skilled PT services to address these deficits and reach maximum level of function.    Recent Surgery: * No surgery found *      Plan:     During this hospitalization, patient to be seen 3 x/week to address the identified rehab impairments via gait training, therapeutic activities, therapeutic exercises, neuromuscular re-education and progress toward the following goals:    · Plan of Care Expires:  09/04/22    Subjective     Chief Complaint: throat / chest pain "feels like someone took a cheese grater to my esophagus"  Patient/Family Comments/goals: Pt agreeable to PT evaluation  Pain/Comfort:  · Pain Rating 1:  (significant pain in throat and chest)    Patients cultural, spiritual, Rastafarian conflicts given the current situation: no    Living Environment:  Pt " will be staying with daughter in a SSH with 3 MYLES (bilateral handrails available). She will have access to a tub-shower combination in the home.   Prior to admission, patients level of function was independent and ambulatory, however, reporting multiple falls this year.  Equipment used at home:  (foldable cane).  DME owned (not currently used): rollator.  Upon discharge, patient will have assistance from daughter.    Objective:     Communicated with IRIS Love prior to session.  Patient found HOB elevated with peripheral IV, telemetry  upon PT entry to room.    General Precautions: Standard, fall   Orthopedic Precautions:N/A   Braces: N/A  Respiratory Status: Room air    Exams:  · Cognition:   · Patient is oriented to self, place, time and situation.  · Pt follows approximately 100% of simple commands.    · Mood: Pleasant and cooperative.   · Safety Awareness: intact  · Musculoskeletal:  · Posture:  Rounded shoulders, forward head  · LE ROM/Strength:   · R ROM: WFL  · L ROM: WFL  · R Strength:   · Hip flexion: 5/5  · Knee extension: 5/5  · Dorsiflexion: 5/5   · L Strength:   · Hip flexion: 5/5  · Knee extension: 5/5  · Dorsiflexion: 5/5   · Neuromuscular:  · Sensation: Impaired to light touch bilateral LEs.   · Tone/Reflexes: No impairments identified with functional mobility. No formal testing performed.   · Balance:   · Static sitting: Hale  · Dynamic sitting: Hale  · Static standing: SBA  · Dynamic standing: CGA  · Visual-vestibular: No impairments identified with functional mobility. No formal testing performed.  · Also of note, the pt reports she has new-onset large volume incontinence  · Integument: Visible skin intact  · Cardiopulmonary:  · Edema: none noted     Functional Mobility:  · Bed Mobility:     · Supine to Sit: independence  · Sit to Supine: independence  · Transfers:     · Sit to Stand:  stand by assistance with no AD  · Gait: x15 feet with contact guard assistance with no AD. Pt with  decreased speed, silvia, bilateral step length and ELIZABETH. Decreased stability, however, no LOB noted.     Therapeutic Activities and Exercises:  Bed mobility, transfer, and gait training as described above.    PT educated patient re:   · PT plan of care/role of PT  · Fall and safety precautions  · Gait deviations  · Use of call light (don't get up without assistance)  Pt verbalized understanding     The patient is safe to transfer with RN assist.   Patient encouraged to sit up in chair throughout the day to prevent deconditioning.     AM-PAC 6 CLICK MOBILITY  Total Score:      Patient left HOB elevated with all lines intact and call button in reach.    GOALS:   Multidisciplinary Problems     Physical Therapy Goals        Problem: Physical Therapy    Goal Priority Disciplines Outcome Goal Variances Interventions   Physical Therapy Goal     PT, PT/OT Ongoing, Progressing     Description: Goals to be met by: 9/4/22    Patient will perform the following to increase strength, improve mobility, and return to prior level of function:    1. Sit<>stand with modified independence with least restrictive assistive device.  2. Gait x 100 feet with modified independence with least restrictive assistive device.  3. Ascend/descend 3 step(s) with bilateral handrails and SBA.                        History:     No past medical history on file.    No past surgical history on file.    Time Tracking:     PT Received On: 08/05/22  PT Start Time: 1017     PT Stop Time: 1034  PT Total Time (min): 17 min     Billable Minutes: Evaluation 17      08/05/2022

## 2022-08-05 NOTE — PLAN OF CARE
Problem: Adult Inpatient Plan of Care  Goal: Plan of Care Review  Outcome: Ongoing, Progressing  Goal: Patient-Specific Goal (Individualized)  Outcome: Ongoing, Progressing  Goal: Absence of Hospital-Acquired Illness or Injury  Outcome: Ongoing, Progressing  Goal: Optimal Comfort and Wellbeing  Outcome: Ongoing, Progressing  Goal: Readiness for Transition of Care  Outcome: Ongoing, Progressing     Problem: Fall Injury Risk  Goal: Absence of Fall and Fall-Related Injury  Outcome: Ongoing, Progressing     Problem: Adjustment to Illness (Stroke, Ischemic/Transient Ischemic Attack)  Goal: Optimal Coping  Outcome: Ongoing, Progressing     Problem: Bowel Elimination Impaired (Stroke, Ischemic/Transient Ischemic Attack)  Goal: Effective Bowel Elimination  Outcome: Ongoing, Progressing     Problem: Cerebral Tissue Perfusion (Stroke, Ischemic/Transient Ischemic Attack)  Goal: Optimal Cerebral Tissue Perfusion  Outcome: Ongoing, Progressing

## 2022-08-05 NOTE — PT/OT/SLP EVAL
Occupational Therapy   Evaluation    Name: Margret Dykes  MRN: [id number]  Admitting Diagnosis:  Altered mental status  Recent Surgery: * No surgery found *      Recommendations:     Discharge Recommendations: outpatient PT  Discharge Equipment Recommendations:   (TBD pending progress)  Barriers to discharge:  None    Assessment:     Margret Dykes is a 72 y.o. female with a medical diagnosis of Altered mental status.  She presents with pain in throat and chest. Performance deficits affecting function: gait instability, impaired balance, impaired functional mobility, impaired self care skills.  Pt agreeable to participating in therapy upon arrival to room.  Pt demonstrates strength and ROM in (B) UE needed for ADLs that is WNL, and is able to don slip on shoes independently.  While ambulating with CGA pt displayed mild instances of postural sway and impaired balance.    Overall, pt tolerated therapy well; however, appears to be impacted by impaired balance during daily routine.  PTA pt reports being (I) with ADLs and mobility but has had 3 falls this year.  Pt reports recently she has noticed some blurry vision.  Pt would benefit from skilled OT services to address problems listed above and increase independence with ADLs.  Outpatient PT is recommended upon d/c from acute care to further address deficits and help pt improve overall functional independence.         Rehab Prognosis: Good; patient would benefit from acute skilled OT services to address these deficits and reach maximum level of function.       Plan:     Patient to be seen 3 x/week to address the above listed problems via self-care/home management, therapeutic activities, therapeutic groups  · Plan of Care Expires: 09/04/22  · Plan of Care Reviewed with: patient    Subjective     Chief Complaint: Pain in throat and chest (a feeling of heartburn)  Patient/Family Comments/goals: Resume PLOF    Occupational Profile:  Living Environment: Pt lives alone in  [place] ( living [facility]), 1 MYLES.  Bathroom has tub/shower.    *Upon discharge pt will be staying with daughter (lives in [place]) SSH, 3 MYLES, HR, tub/shower.  Previous level of function:   -ADLs: Independent (but states she could use some help sometimes)  -Mobility: Independent-Mod I.  Pt states she always carries foldable cane in her purse.  Roles and Routines: Mother, drives, enjoys walking  Equipment Used at Home:   (foldable cane)   -pt also owns rollator, but not currently using.  States she also owns variety of canes  Assistance upon Discharge: Pt states she has several close friends that live in her community.  Daughter will be staying with her     Pain/Comfort:  · Pain Rating 1:  (significant pain in throat and chest)    Patients cultural, spiritual, Jehovah's witness conflicts given the current situation: no    Objective:     Communicated with: RN prior to session.  Patient found HOB elevated with peripheral IV upon OT entry to room.    General Precautions: Standard, fall   Orthopedic Precautions:N/A   Braces: N/A  Respiratory Status: Room air    Occupational Performance:    Bed Mobility:    · Supine <> sit: Independent  · Scooting: Independent  · Sit <> supine: Independent    Functional Mobility/Transfers:  · Sit <> Stand: SBA x 1 trial from EOB  · Functional Mobility: Pt ambulated ~20 ft with CGA.  · Mild postural sway and impaired balance observed.  · Pt states she feels like she is having to think about how she walks now    Activities of Daily Living:  · Lower Body Dressing: Independent for donning slip on shoes while seated at EOB.    Cognitive/Visual Perceptual:  Cognitive/Psychosocial Skills:    -       Oriented to: Person, Place, Time and Situation   -       Follows Commands/attention: Follows multistep commands  -       Communication: clear/fluent  -       Memory: No Deficits noted  -       Safety awareness/insight to disability: intact   -       Mood/Affect/Coping skills/emotional  control: Cooperative and Pleasant    Physical Exam:  Postural examination/scapula alignment:    -       No postural abnormalities identified  Skin integrity: Visible skin intact  Edema:  None noted  Sensation: -       Intact  Motor Planning: -       WFL  Dominant hand: -       Right  Upper Extremity Range of Motion:    -       Right Upper Extremity: WNL  -       Left Upper Extremity: WNL  Upper Extremity Strength:   -       Right Upper Extremity: WNL; 5/5 all muscle groups  -       Left Upper Extremity: WNL; 5/5 all muscle groups   Strength: 5/5 both hands  Fine Motor Coordination:  Intact  Gross motor coordination: WFL  Balance:  Sitting- Independent; Standing- SBA-CGA  Vision: Reports blurry vision on occasion    AMPAC 6 Click ADL:  AMPA Total Score: 21    Treatment & Education:  *Pt educated on role of OT in acute care setting  *POC discussed  Education:    Patient left HOB elevated with call button in reach    GOALS:   Multidisciplinary Problems     Occupational Therapy Goals        Problem: Occupational Therapy    Goal Priority Disciplines Outcome Interventions   Occupational Therapy Goal     OT, PT/OT Ongoing, Progressing    Description: Goals to be met by: 8/12/2022     Patient will increase functional independence with ADLs by performing:    UE Dressing with Walden.  LE Dressing with Walden.  Grooming while standing at sink with Walden.  Toileting from toilet with Supervision for hygiene and clothing management.   Step transfer with Walden  Toilet transfer to toilet with Supervision.                     History:     No past medical history on file.    No past surgical history on file.    Time Tracking:     OT Date of Treatment: 08/05/22  OT Start Time: 1017  OT Stop Time: 1034  OT Total Time (min): 17 min    Billable Minutes:Evaluation 17   *Completed with PT    8/5/2022

## 2022-08-05 NOTE — PLAN OF CARE
Problem: SLP  Goal: SLP Goal  Description: 1. Pt will consume a regular solid and thin liquid diet without overt s/s of aspiration or airway threat during 100% of po intake without assistance.  2. Ongoing cognitive-communicative assessment.   Outcome: Ongoing, Progressing    SLP to follow-up 2-3x/week for ongoing assessment of swallow function and cognitive-communicative status.

## 2022-08-05 NOTE — PLAN OF CARE
Problem: Occupational Therapy  Goal: Occupational Therapy Goal  Description: Goals to be met by: 8/12/2022     Patient will increase functional independence with ADLs by performing:    UE Dressing with Jenkins.  LE Dressing with Jenkins.  Grooming while standing at sink with Jenkins.  Toileting from toilet with Supervision for hygiene and clothing management.   Step transfer with Jenkins  Toilet transfer to toilet with Supervision.    Outcome: Ongoing, Progressing       OT evaluation complete and POC established.  OP PT is recommended upon d/c from acute care to further address deficits and help pt improve overall functional independence.     HAI Ramos  8/5/2022

## 2022-08-05 NOTE — CONSULTS
[facility]  Vascular Neurology  Comprehensive Stroke Center  TeleVascular Neurology Acute Consultation Note      Consults    Consulting Provider: CHARLY NICHOLAS.  Current Providers  No providers found    Patient Location:  [facility] EMERGENCY DEPARTMENT Emergency Department  Spoke hospital nurse at bedside with patient assisting consultant.     Patient information was obtained from patient.         Assessment/Plan:   Pt is in the [facility] ED. Symptoms: AMS, slurred speech LKW: 17:00 today. NIHSS-0 for my exam. CT brain did not show any acute intracranial abnormalities. CTA head and neck with no LVO. MRI brain did not show any stroke. TPA not given as NIHSS-0.     Encephalopathy, per daughter she might have mistakenly taken Ambien.   Recommend aspirin if ok with hepatology team  Neuro consult  EEG    Diagnoses:   Stroke like symptoms    STROKE DOCUMENTATION     Acute Stroke Times:   Acute Stroke Times   Last Known Normal Date: 08/04/22  Last Known Normal Time: 1700  Stroke Team Called Date: 08/04/22  Stroke Team Called Time: 1848  Stroke Team Arrival Date: 08/04/22  Stroke Team Arrival Time: 1849  Alteplase Recommended: No  Thrombectomy Recommended: No    NIH Scale:  1a. Level of Consciousness: 0-->Alert, keenly responsive  1b. LOC Questions: 0-->Answers both questions correctly  1c. LOC Commands: 0-->Performs both tasks correctly  2. Best Gaze: 0-->Normal  3. Visual: 0-->No visual loss  4. Facial Palsy: 0-->Normal symmetrical movements  5a. Motor Arm, Left: 0-->No drift, limb holds 90 (or 45) degrees for full 10 secs  5b. Motor Arm, Right: 0-->No drift, limb holds 90 (or 45) degrees for full 10 secs  6a. Motor Leg, Left: 0-->No drift, leg holds 30 degree position for full 5 secs  6b. Motor Leg, Right: 0-->No drift, leg holds 30 degree position for full 5 secs  7. Limb Ataxia: 0-->Absent  8. Sensory: 0-->Normal, no sensory loss  9. Best Language: 0-->No aphasia, normal  10. Dysarthria:  "0-->Normal  11. Extinction and Inattention (formerly Neglect): 0-->No abnormality  Total (NIH Stroke Scale): 0     Modified [place]    [place] Coma Scale:    ABCD2 Score:    [id number] Score:   HAS -BLED Score:   ICH Score:   Hunt & Escalante Classification:       Blood pressure 110/60, pulse (!) 113, temperature 99 °F (37.2 °C), temperature source Oral, resp. rate 20, height 5' 2" (1.575 m), weight 66.7 kg (147 lb), SpO2 97 %.  Alteplase Eligible?: Yes  Alteplase Recommendation: Alteplase not recommended due to Patient back to neurological baseline  Possible Interventional Revascularization Candidate? No; No large vessel occlusion identified on imaging     Disposition Recommendation: admit to inpatient    Subjective:     History of Present Illness:  Pt is in the [facility] ED. Symptoms: AMS, slurred speech LKW: 17:00 today    No current facility-administered medications for this encounter.  No current outpatient medications on file.          Woke up with symptoms?: no    Recent bleeding noted: no  Does the patient take any Blood Thinners? no  Medications: No relevant medications      Current Facility-Administered Medications:     aluminum-magnesium hydroxide-simethicone 200-200-20 mg/5 mL suspension 30 mL, 30 mL, Oral, Once **AND** LIDOcaine HCl 2% oral solution 10 mL, 10 mL, Oral, Once, Bette Sharma NP    sodium chloride 0.9% bolus 1,000 mL, 1,000 mL, Intravenous, ED 1 Time, Bette Sharma NP, Last Rate: 500 mL/hr at 08/04/22 2235, 1,000 mL at 08/04/22 2235  No current outpatient medications on file.    Past Medical History: no relevant history    Past Surgical History: no major surgeries within the last 2 weeks    Family History: no relevant history    Social History: unable to obtain    Allergies: Compazine [Prochlorperazine] No relevant allergies    Review of Systems  Objective:   Vitals: Blood pressure 110/60, pulse (!) 113, temperature 99 °F (37.2 °C), temperature source Oral, resp. rate 20, height 5' 2" " (1.575 m), weight 66.7 kg (147 lb), SpO2 97 %. BP: 110/60    CT READ: No    Physical Exam          Recommended the emergency room physician to have a brief discussion with the patient and/or family if available regarding the  risks and benefits of treatment, and to briefly document the occurrence of that discussion in his clinical encounter note.     The attending portion of this evaluation, treatment, and documentation was performed per Arturo Corea MD via audiovisual.    Billing code:  (non-intervention mild to moderate stroke, TIA, some mimics)    · This patient has a critical neurological condition/illness, with some potential for high morbidity and mortality.  · There is a moderate probability for acute neurological change leading to clinical and possibly life-threatening deterioration requiring highest level of physician preparedness for urgent intervention.  · Care was coordinated with other physicians involved in the patient's care.  · Radiologic studies and laboratory data were reviewed and interpreted, and plan of care was re-assessed based on the results.  · Diagnosis, treatment options and prognosis may have been discussed with the patient and/or family members or caregiver.      In your opinion, this was a: Tier 1 Van Negative    Consult End Time: 10:58 PM     Arturo Corea MD  Comprehensive Stroke Center  Vascular Neurology   [facility]

## 2022-08-05 NOTE — PLAN OF CARE
08/05/22 1355   Physical Activity   On average, how many days per week do you engage in moderate to strenuous exercise (like a brisk walk)? 0 days   On average, how many minutes do you engage in exercise at this level? 0 min   Financial Resource Strain   How hard is it for you to pay for the very basics like food, housing, medical care, and heating? Not hard   Housing Stability   In the last 12 months, was there a time when you were not able to pay the mortgage or rent on time? N   In the last 12 months, was there a time when you did not have a steady place to sleep or slept in a shelter (including now)? N   Transportation Needs   In the past 12 months, has lack of transportation kept you from medical appointments or from getting medications? no   In the past 12 months, has lack of transportation kept you from meetings, work, or from getting things needed for daily living? No   Food Insecurity   Within the past 12 months, you worried that your food would run out before you got the money to buy more. Never true   Within the past 12 months, the food you bought just didn't last and you didn't have money to get more. Never true   Stress   Do you feel stress - tense, restless, nervous, or anxious, or unable to sleep at night because your mind is troubled all the time - these days? Not at all   Social Connections   In a typical week, how many times do you talk on the phone with family, friends, or neighbors? More than 3   How often do you get together with friends or relatives? More than 3   How often do you attend Tenriism or Restoration services? Never   Do you belong to any clubs or organizations such as Tenriism groups, unions, fraternal or athletic groups, or school groups? No   How often do you attend meetings of the clubs or organizations you belong to? Never   Are you , , , , never , or living with a partner? Never marrie   Alcohol Use   Q1: How often do you have a drink  containing alcohol? Never   Q2: How many drinks containing alcohol do you have on a typical day when you are drinking? None   Q3: How often do you have six or more drinks on one occasion? Never

## 2022-08-05 NOTE — PT/OT/SLP EVAL
"Speech Language Pathology Evaluation  Bedside Swallow    Patient Name:  Margret Dykes   MRN:  [id number]  Admitting Diagnosis: Altered mental status    Recommendations:                 General Recommendations:    1. Speech pathology to follow 2-3x/week for ongoing assessment of swallow function given pt reports of globus sensation.   2. Ongoing cognitive-communicative assessment recommended 2/2 pt admit with AMS.    Diet recommendations:   Solids: Regular,  Liquids: Thin     Aspiration Precautions:   1. Standard TATIANNA precautions  2. Sit upright at 90 degrees  3. Remain upright for 30 minutes before/after meals  4. Elevate HOB at night    General Precautions: Standard,      Communication strategies: n/a    History:     Per chart review:  "72-year-old female presents for evaluation of altered mental status.  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  Patient is currently staying with her daughter.  Daughter reports that at approximately 4:00 p.m., acute onset of altered mental status with slurred speech.  Generalized weakness and fatigue.  No facial droop.  No unilateral weakness.  Patient continues to exhibit confusion with slurred speech.  Family members concerned that she may have accidentally taken prescription medication that can contribute to her symptoms, but patient denies taking any medications at the time of acute onset of AMS.     Additionally, patient reports continued chest pain, shortness of breath, upper abdominal pain since last night.  She was seen at outside facility and diagnosed with GERD with a hiatal hernia.     This is the extent of patient's complaints for this ER encounter.      The history is provided by the patient and a relative."    MRI 8/4/22:  "Impression:     Involutional and chronic small vessel gliotic signal changes throughout the brain.  Correlate for history of demyelination.     No acute abnormality to suggest acute infarct, " "hemorrhage or mass"    Pt seen today for bedside swallow evaluation: Pt reports onset of globus sensation with meals and meds beginning in December. States pt has experienced globus sensation that lasted for 2+ days after taking medication. Pt recently diagnosed with GERD which is being medically managed by PPI. Pt lives in an independent living facility in [place]. She is in [place] visiting her daughter.     Subjective     · Pt passed [place] Swallow Screen. However, diet has not been initiated.   · Pt awake/alert, agreeable to SLP evaluation.     Respiratory Status: Room air    Objective:     Cognitive-Communicative Status:  Pt awake/alert, oriented to person, place, situation, month, day of the week, and year. SLP re-oriented pt to date. Pt able to sustain attention without distraction throughout session. Able to participate in facilitated discourse regarding personal information and PMH without difficulty. Answered questions correctly and within appropriate response time. No delay in information processing noted. Divergent and convergent reasoning WFL. Pt able to answer problem solving questions given everyday scenarios without difficulty. Pt denies any confusion. Stated she feels as though she has returned to her cognitive-communicative baseline. SLP to continue to assess.    Language:  Pt followed 1-3 step simple and complex commands with 100% acc. Confrontation naming WFL. During generative naming task, pt able to generate x19 concrete category members within 1-minute. Expressive and receptive language skills WFL.    Oral Musculature Evaluation  · Oral Musculature: WFL  · Dentition: edentulous  · Secretion Management: adequate  · Mucosal Quality: adequate  · Mandibular Strength and Mobility: WFL  · Oral Labial Strength and Mobility: WFL, functional coordination, functional retraction, functional pursing, functional seal  · Lingual Strength and Mobility: WFL, functional strength, functional " protrusion, functional anterior elevation, functional lateral movement  · Velar Elevation: WFL  · Buccal Strength and Mobility: WFL  · Voice Prior to PO Intake: Clear  · Oral Musculature Comments: Normal oral motor examination; Face symmetrical at rest and with movement; WFL for facial, labial, and lingual ROM and strength. No dysarthria observed; speech 100% intelligible in known/unknown context.    Bedside Swallow Eval:   Consistencies Assessed:  · Ice chips x1  · Thin liquids 1/3tsp, 1-5ml via cup edge  · Puree 1tsp boluses  · Solids small pieces of dry solids     Oral Phase:   · Functional labial seal  · Up/down masticatory pattern 2/2 pt being edentulous, however, functional  · WFL for a-p transport  · No oral residue observed    Pharyngeal Phase:   · Adequate laryngeal elevation/excursion on subjective palpation  · Trigger of the pharyngeal swallow appears to be WFL  · No overt s/s of aspiration or airway threat during 100% of po intake- no coughing, choking, changes in breath stream or vocal quality  · S/s of esophageal dysphagia- burping/belching following trials of solid boluses; reported burning in chest.    Treatment: SLP provided education to pt on role in stroke workup, as well as, reported dysphagia. Notified RN and Dr. Estrada of diet recs. SLP to follow-up.    Assessment:     Margret Dykes is a 72 y.o. female with an SLP diagnosis of Dysphagia.      Goals:   Multidisciplinary Problems     SLP Goals        Problem: SLP    Goal Priority Disciplines Outcome   SLP Goal     SLP Ongoing, Progressing   Description: 1. Pt will consume a regular solid and thin liquid diet without overt s/s of aspiration or airway threat during 100% of po intake without assistance.  2. Ongoing cognitive-communicative assessment.                    Plan:     · Patient to be seen:  2 x/week, 3 x/week   · Plan of Care expires:  08/19/22  · Plan of Care reviewed with:  patient   · SLP Follow-Up:  Yes       Discharge  recommendations:  other (see comments) (TBD)       Time Tracking:     SLP Treatment Date:   08/05/22  Speech Start Time:  0808  Speech Stop Time:  0834     Speech Total Time (min):  26 min    Billable Minutes: Eval Swallow and Oral Function 26 min    08/05/2022

## 2022-08-05 NOTE — HPI
Pt is in the [facility] ED. Symptoms: AMS, slurred speech LKW: 17:00 today    No current facility-administered medications for this encounter.  No current outpatient medications on file.

## 2022-08-05 NOTE — ED NOTES
Notified radiology tech that the pt has MRI order and the MRI checklist was completed.   Rad tech told me to call MRI.     I called [phone]. Line went to Voicemail.     Sonal, charge nurse notified.

## 2022-08-05 NOTE — ASSESSMENT & PLAN NOTE
Patient presented with visual hallucinations, slurred speech; resolved. CTH, CTA, MRI brain all without evidence of acute etiology. Slurred speech and hallucinations resolved, patient AO x4 currently    UDS + opioid, amphetamine, THC  Daughter reports patient has a bottle of Trazodone and Ambien and is concerned her mother mismanaged her medications  [facility] Neuro consulted: Recommend aspirin if ok with hepatology team  Will defer to outpatient hepatology  SLP cleared for regular diet  PTOT pending  TTE

## 2022-08-05 NOTE — PLAN OF CARE
Problem: Physical Therapy  Goal: Physical Therapy Goal  Description: Goals to be met by: 9/4/22    Patient will perform the following to increase strength, improve mobility, and return to prior level of function:    1. Sit<>stand with modified independence with least restrictive assistive device.  2. Gait x 100 feet with modified independence with least restrictive assistive device.  3. Ascend/descend 3 step(s) with bilateral handrails and SBA.       Outcome: Ongoing, Progressing     PT orders received. PT evaluation completed and goals/POC established. Pt tolerated evaluation well with no adverse reactions. Pt performed in-room ambulation with assistance and no AD; pt with noted stability deficits. PT will continue to follow and progress goals as tolerated. Recommend discharge to outpatient PT.

## 2022-08-05 NOTE — ASSESSMENT & PLAN NOTE
Patient states from Hepatitis, however she has EtOH abuse listed in her chart on outside records  Unclear which medications she is on and who manages her cirrhosis given [place]  Will refer to helpatology here  Ammonia WNL, low suspicion for hepatic encephalopathy driving presentation    MELD-Na score: 9 at 8/4/2022 10:43 PM  MELD score: 9 at 8/4/2022 10:43 PM  Calculated from:  Serum Creatinine: 1.0 mg/dL at 8/4/2022  6:45 PM  Serum Sodium: 139 mmol/L (Using max of 137 mmol/L) at 8/4/2022  6:45 PM  Total Bilirubin: 1.2 mg/dL at 8/4/2022  6:45 PM  INR(ratio): 1.2 at 8/4/2022 10:43 PM  Age: 72 years

## 2022-08-05 NOTE — H&P
"[facility] ([facility]  History & Physical    Patient Name: Margret Dykes  MRN: [id number]  Patient Class: OP- Observation  Admission Date: 8/4/2022  Attending Physician: Kaden Estrada MD   Primary Care Provider: Primary Doctor No         Patient information was obtained from patient, relative(s), past medical records and ER records.     Subjective:     Principal Problem:Altered mental status    Chief Complaint:   Chief Complaint   Patient presents with    Altered Mental Status     Family member states pt is altered. Hx of liver diease. Pt was at [facility] yesterday for acid reflex and was told it is most likely a hiatal hernia. Family member states pt has been tired and weak today but within the last 1-2 hrs pt became confused. Clear speech and speaking in full sentences. Pt is disoriented to month but able to answer other questions appropriately. Slow to respond appears confused. No droop noted. Normal strength to extremities x 4.     Weakness      Pt appears lethargic. Family member states pt has a prescription for trazodone and valium. Pt does know remember what pills she took.    Chest Pain    Shortness of Breath    Headache     Pt c/o posterior headache.          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 She describes the hallucinations as "trippy" like being on "hallucinogenics". At time of my exam her slurred speech has resolved and she denies hallucinations. She does take Ambien to sleep from time to time but says she has not taken Ambien in about 5 days. Her daughter however is concerned that she mismanaged her medications and accidentally took Ambien and Trazodone. Ms. Dykes denies alcohol or drug use. Though her UDS is + for opioids, amphetamines (adderall), THC. She says she received IV pain medications at a different hospital yesterday. She also reports " chronic shortness of breath, indigestion      No past medical history on file.    No past surgical history on file.    Review of patient's allergies indicates:   Allergen Reactions    Compazine [prochlorperazine] Nausea And Vomiting       No current facility-administered medications on file prior to encounter.     Current Outpatient Medications on File Prior to Encounter   Medication Sig    dextroamphetamine-amphetamine (ADDERALL) 15 mg tablet Take 15 mg by mouth 2 (two) times daily.    EScitalopram oxalate (LEXAPRO) 20 MG tablet Take 20 mg by mouth every morning.    multivitamin (THERAGRAN) tablet Take 1 tablet by mouth once daily.    pantoprazole (PROTONIX) 40 MG tablet Take 40 mg by mouth 2 (two) times daily.    sucralfate (CARAFATE) 1 gram tablet Take 1 g by mouth.    thiamine 100 MG tablet Take 100 mg by mouth once daily.    traMADoL (ULTRAM) 50 mg tablet Take 50 mg by mouth every 4 (four) hours as needed.    zolpidem (AMBIEN) 5 MG Tab Take 5 mg by mouth nightly as needed.    famotidine (PEPCID) 20 MG tablet Take 20 mg by mouth every morning.    ferrous sulfate 325 (65 FE) MG EC tablet Take by mouth once daily.    hydrocortisone (ANUSOL-HC) 2.5 % rectal cream SMARTSIG:Rectally 4 Times Daily    montelukast (SINGULAIR) 10 mg tablet Take 10 mg by mouth once daily.    ondansetron (ZOFRAN) 4 MG tablet Take 4 mg by mouth every 8 (eight) hours as needed.     Family History    None       Tobacco Use    Smoking status: Not on file    Smokeless tobacco: Not on file   Substance and Sexual Activity    Alcohol use: Not on file    Drug use: Not on file    Sexual activity: Not on file     Review of Systems   Constitutional:  Negative for diaphoresis, fatigue and fever.   Eyes:  Positive for visual disturbance. Negative for pain.   Respiratory:  Positive for cough and shortness of breath.    Cardiovascular:  Negative for chest pain, palpitations and leg swelling.   Gastrointestinal:  Positive for abdominal  pain. Negative for diarrhea, nausea and vomiting.   Genitourinary:  Negative for difficulty urinating and dysuria.   Musculoskeletal:  Negative for arthralgias and gait problem.   Skin:  Negative for color change and rash.   Neurological:  Positive for speech difficulty. Negative for tremors, light-headedness and headaches.   Psychiatric/Behavioral:  Positive for confusion, hallucinations and sleep disturbance.    Objective:     Vital Signs (Most Recent):  Temp: 97.5 °F (36.4 °C) (08/05/22 0818)  Pulse: 81 (08/05/22 0818)  Resp: 18 (08/05/22 0818)  BP: 115/80 (08/05/22 0818)  SpO2: 97 % (08/05/22 0818) Vital Signs (24h Range):  Temp:  [97.5 °F (36.4 °C)-99 °F (37.2 °C)] 97.5 °F (36.4 °C)  Pulse:  [] 81  Resp:  [18-22] 18  SpO2:  [93 %-97 %] 97 %  BP: (108-144)/(57-80) 115/80     Weight: 70.8 kg (156 lb)  Body mass index is 28.53 kg/m².    Physical Exam  Constitutional:       General: She is not in acute distress.     Appearance: Normal appearance. She is not toxic-appearing.   HENT:      Head: Normocephalic and atraumatic.   Eyes:      Extraocular Movements: Extraocular movements intact.   Cardiovascular:      Rate and Rhythm: Normal rate and regular rhythm.   Pulmonary:      Effort: Pulmonary effort is normal. No respiratory distress.      Breath sounds: No wheezing.   Abdominal:      General: Abdomen is flat. There is no distension.      Palpations: Abdomen is soft.      Tenderness: There is abdominal tenderness (epigastric).   Musculoskeletal:      Right lower leg: No edema.      Left lower leg: No edema.   Skin:     General: Skin is warm and dry.   Neurological:      General: No focal deficit present.      Mental Status: She is alert and oriented to person, place, and time.   Psychiatric:         Mood and Affect: Mood normal.         Behavior: Behavior normal.           Significant Labs: All pertinent labs within the past 24 hours have been reviewed.    Significant Imaging: I have reviewed all pertinent  imaging results/findings within the past 24 hours.    Assessment/Plan:     * Altered mental status  Patient presented with visual hallucinations, slurred speech; resolved. CTH, CTA, MRI brain all without evidence of acute etiology. Slurred speech and hallucinations resolved, patient AO x4 currently    UDS + opioid, amphetamine, THC  Daughter reports patient has a bottle of Trazodone and Ambien and is concerned her mother mismanaged her medications  Vasc Neuro consulted: Recommend aspirin if ok with hepatology team  Will defer to outpatient hepatology  SLP cleared for regular diet  PTOT pending  TTE      Cirrhosis  Patient states from Hepatitis, however she has EtOH abuse listed in her chart on outside records  Unclear which medications she is on and who manages her cirrhosis given [place]  Will refer to helpatology here  Ammonia WNL, low suspicion for hepatic encephalopathy driving presentation    MELD-Na score: 9 at 8/4/2022 10:43 PM  MELD score: 9 at 8/4/2022 10:43 PM  Calculated from:  Serum Creatinine: 1.0 mg/dL at 8/4/2022  6:45 PM  Serum Sodium: 139 mmol/L (Using max of 137 mmol/L) at 8/4/2022  6:45 PM  Total Bilirubin: 1.2 mg/dL at 8/4/2022  6:45 PM  INR(ratio): 1.2 at 8/4/2022 10:43 PM  Age: 72 years          VTE Risk Mitigation (From admission, onward)         Ordered     IP VTE HIGH RISK PATIENT  Once         08/05/22 0818     Place sequential compression device  Until discontinued         08/05/22 0325                   Rosa Maria Black PA-C  Department of Hospital Medicine   [facility] [place] [facility] ([place])

## 2022-08-05 NOTE — SUBJECTIVE & OBJECTIVE
No past medical history on file.    No past surgical history on file.    Review of patient's allergies indicates:   Allergen Reactions    Compazine [prochlorperazine] Nausea And Vomiting       No current facility-administered medications on file prior to encounter.     Current Outpatient Medications on File Prior to Encounter   Medication Sig    dextroamphetamine-amphetamine (ADDERALL) 15 mg tablet Take 15 mg by mouth 2 (two) times daily.    EScitalopram oxalate (LEXAPRO) 20 MG tablet Take 20 mg by mouth every morning.    multivitamin (THERAGRAN) tablet Take 1 tablet by mouth once daily.    pantoprazole (PROTONIX) 40 MG tablet Take 40 mg by mouth 2 (two) times daily.    sucralfate (CARAFATE) 1 gram tablet Take 1 g by mouth.    thiamine 100 MG tablet Take 100 mg by mouth once daily.    traMADoL (ULTRAM) 50 mg tablet Take 50 mg by mouth every 4 (four) hours as needed.    zolpidem (AMBIEN) 5 MG Tab Take 5 mg by mouth nightly as needed.    famotidine (PEPCID) 20 MG tablet Take 20 mg by mouth every morning.    ferrous sulfate 325 (65 FE) MG EC tablet Take by mouth once daily.    hydrocortisone (ANUSOL-HC) 2.5 % rectal cream SMARTSIG:Rectally 4 Times Daily    montelukast (SINGULAIR) 10 mg tablet Take 10 mg by mouth once daily.    ondansetron (ZOFRAN) 4 MG tablet Take 4 mg by mouth every 8 (eight) hours as needed.     Family History    None       Tobacco Use    Smoking status: Not on file    Smokeless tobacco: Not on file   Substance and Sexual Activity    Alcohol use: Not on file    Drug use: Not on file    Sexual activity: Not on file     Review of Systems   Constitutional:  Negative for diaphoresis, fatigue and fever.   Eyes:  Positive for visual disturbance. Negative for pain.   Respiratory:  Positive for cough and shortness of breath.    Cardiovascular:  Negative for chest pain, palpitations and leg swelling.   Gastrointestinal:  Positive for abdominal pain. Negative for diarrhea, nausea and vomiting.   Genitourinary:   Negative for difficulty urinating and dysuria.   Musculoskeletal:  Negative for arthralgias and gait problem.   Skin:  Negative for color change and rash.   Neurological:  Positive for speech difficulty. Negative for tremors, light-headedness and headaches.   Psychiatric/Behavioral:  Positive for confusion, hallucinations and sleep disturbance.    Objective:     Vital Signs (Most Recent):  Temp: 97.5 °F (36.4 °C) (08/05/22 0818)  Pulse: 81 (08/05/22 0818)  Resp: 18 (08/05/22 0818)  BP: 115/80 (08/05/22 0818)  SpO2: 97 % (08/05/22 0818) Vital Signs (24h Range):  Temp:  [97.5 °F (36.4 °C)-99 °F (37.2 °C)] 97.5 °F (36.4 °C)  Pulse:  [] 81  Resp:  [18-22] 18  SpO2:  [93 %-97 %] 97 %  BP: (108-144)/(57-80) 115/80     Weight: 70.8 kg (156 lb)  Body mass index is 28.53 kg/m².    Physical Exam  Constitutional:       General: She is not in acute distress.     Appearance: Normal appearance. She is not toxic-appearing.   HENT:      Head: Normocephalic and atraumatic.   Eyes:      Extraocular Movements: Extraocular movements intact.   Cardiovascular:      Rate and Rhythm: Normal rate and regular rhythm.   Pulmonary:      Effort: Pulmonary effort is normal. No respiratory distress.      Breath sounds: No wheezing.   Abdominal:      General: Abdomen is flat. There is no distension.      Palpations: Abdomen is soft.      Tenderness: There is abdominal tenderness (epigastric).   Musculoskeletal:      Right lower leg: No edema.      Left lower leg: No edema.   Skin:     General: Skin is warm and dry.   Neurological:      General: No focal deficit present.      Mental Status: She is alert and oriented to person, place, and time.   Psychiatric:         Mood and Affect: Mood normal.         Behavior: Behavior normal.           Significant Labs: All pertinent labs within the past 24 hours have been reviewed.    Significant Imaging: I have reviewed all pertinent imaging results/findings within the past 24 hours.

## 2022-08-05 NOTE — SUBJECTIVE & OBJECTIVE
"  Woke up with symptoms?: no    Recent bleeding noted: no  Does the patient take any Blood Thinners? no  Medications: No relevant medications      Current Facility-Administered Medications:     aluminum-magnesium hydroxide-simethicone 200-200-20 mg/5 mL suspension 30 mL, 30 mL, Oral, Once **AND** LIDOcaine HCl 2% oral solution 10 mL, 10 mL, Oral, Once, Bette Sharma NP    sodium chloride 0.9% bolus 1,000 mL, 1,000 mL, Intravenous, ED 1 Time, Bette Sharma NP, Last Rate: 500 mL/hr at 08/04/22 2235, 1,000 mL at 08/04/22 2235  No current outpatient medications on file.    Past Medical History: no relevant history    Past Surgical History: no major surgeries within the last 2 weeks    Family History: no relevant history    Social History: unable to obtain    Allergies: Compazine [Prochlorperazine] No relevant allergies    Review of Systems  Objective:   Vitals: Blood pressure 110/60, pulse (!) 113, temperature 99 °F (37.2 °C), temperature source Oral, resp. rate 20, height 5' 2" (1.575 m), weight 66.7 kg (147 lb), SpO2 97 %. BP: 110/60    CT READ: No    Physical Exam      "

## 2022-08-06 VITALS
BODY MASS INDEX: 28.71 KG/M2 | DIASTOLIC BLOOD PRESSURE: 75 MMHG | SYSTOLIC BLOOD PRESSURE: 112 MMHG | TEMPERATURE: 98 F | WEIGHT: 156 LBS | OXYGEN SATURATION: 97 % | HEIGHT: 62 IN | RESPIRATION RATE: 18 BRPM | HEART RATE: 104 BPM

## 2022-08-06 LAB
ALBUMIN SERPL BCP-MCNC: 2.7 G/DL (ref 3.5–5.2)
ALP SERPL-CCNC: 89 U/L (ref 55–135)
ALT SERPL W/O P-5'-P-CCNC: 20 U/L (ref 10–44)
ANION GAP SERPL CALC-SCNC: 6 MMOL/L (ref 8–16)
AST SERPL-CCNC: 27 U/L (ref 10–40)
BILIRUB SERPL-MCNC: 0.6 MG/DL (ref 0.1–1)
BUN SERPL-MCNC: 12 MG/DL (ref 8–23)
CALCIUM SERPL-MCNC: 7.7 MG/DL (ref 8.7–10.5)
CHLORIDE SERPL-SCNC: 107 MMOL/L (ref 95–110)
CO2 SERPL-SCNC: 25 MMOL/L (ref 23–29)
CREAT SERPL-MCNC: 0.8 MG/DL (ref 0.5–1.4)
EST. GFR  (NO RACE VARIABLE): >60 ML/MIN/1.73 M^2
GLUCOSE SERPL-MCNC: 136 MG/DL (ref 70–110)
MAGNESIUM SERPL-MCNC: 2.2 MG/DL (ref 1.6–2.6)
PHOSPHATE SERPL-MCNC: 2.3 MG/DL (ref 2.7–4.5)
POTASSIUM SERPL-SCNC: 4.3 MMOL/L (ref 3.5–5.1)
PROT SERPL-MCNC: 5.2 G/DL (ref 6–8.4)
SODIUM SERPL-SCNC: 138 MMOL/L (ref 136–145)

## 2022-08-06 PROCEDURE — 83735 ASSAY OF MAGNESIUM: CPT | Performed by: PHYSICIAN ASSISTANT

## 2022-08-06 PROCEDURE — 99217 PR OBSERVATION CARE DISCHARGE: CPT | Mod: ,,, | Performed by: PHYSICIAN ASSISTANT

## 2022-08-06 PROCEDURE — 36415 COLL VENOUS BLD VENIPUNCTURE: CPT | Performed by: PHYSICIAN ASSISTANT

## 2022-08-06 PROCEDURE — G0378 HOSPITAL OBSERVATION PER HR: HCPCS

## 2022-08-06 PROCEDURE — 25000003 PHARM REV CODE 250: Performed by: INTERNAL MEDICINE

## 2022-08-06 PROCEDURE — 94761 N-INVAS EAR/PLS OXIMETRY MLT: CPT

## 2022-08-06 PROCEDURE — 25000003 PHARM REV CODE 250: Performed by: PHYSICIAN ASSISTANT

## 2022-08-06 PROCEDURE — 99217 PR OBSERVATION CARE DISCHARGE: ICD-10-PCS | Mod: ,,, | Performed by: PHYSICIAN ASSISTANT

## 2022-08-06 PROCEDURE — 80053 COMPREHEN METABOLIC PANEL: CPT | Performed by: PHYSICIAN ASSISTANT

## 2022-08-06 PROCEDURE — 84100 ASSAY OF PHOSPHORUS: CPT | Performed by: PHYSICIAN ASSISTANT

## 2022-08-06 RX ORDER — DIPHENHYDRAMINE HCL 25 MG
25 CAPSULE ORAL NIGHTLY PRN
Status: DISCONTINUED | OUTPATIENT
Start: 2022-08-06 | End: 2022-08-06 | Stop reason: HOSPADM

## 2022-08-06 RX ORDER — SUCRALFATE 1 G/1
1 TABLET ORAL
Qty: 120 TABLET | Refills: 2 | Status: SHIPPED | OUTPATIENT
Start: 2022-08-06 | End: 2022-09-05

## 2022-08-06 RX ORDER — MAG HYDROX/ALUMINUM HYD/SIMETH 200-200-20
30 SUSPENSION, ORAL (FINAL DOSE FORM) ORAL ONCE
Status: COMPLETED | OUTPATIENT
Start: 2022-08-06 | End: 2022-08-06

## 2022-08-06 RX ORDER — LIDOCAINE HYDROCHLORIDE 20 MG/ML
15 SOLUTION OROPHARYNGEAL ONCE
Status: COMPLETED | OUTPATIENT
Start: 2022-08-06 | End: 2022-08-06

## 2022-08-06 RX ADMIN — ASPIRIN 81 MG: 81 TABLET, COATED ORAL at 09:08

## 2022-08-06 RX ADMIN — PANTOPRAZOLE SODIUM 40 MG: 40 TABLET, DELAYED RELEASE ORAL at 09:08

## 2022-08-06 RX ADMIN — ALUMINUM HYDROXIDE, MAGNESIUM HYDROXIDE, AND SIMETHICONE 30 ML: 200; 200; 20 SUSPENSION ORAL at 01:08

## 2022-08-06 RX ADMIN — Medication 100 MG: at 09:08

## 2022-08-06 RX ADMIN — DIPHENHYDRAMINE HYDROCHLORIDE 25 MG: 25 CAPSULE ORAL at 01:08

## 2022-08-06 RX ADMIN — THERA TABS 1 TABLET: TAB at 09:08

## 2022-08-06 RX ADMIN — SUCRALFATE 1 G: 1 TABLET ORAL at 06:08

## 2022-08-06 RX ADMIN — ESCITALOPRAM OXALATE 20 MG: 10 TABLET ORAL at 06:08

## 2022-08-06 RX ADMIN — ATORVASTATIN CALCIUM 40 MG: 20 TABLET, FILM COATED ORAL at 09:08

## 2022-08-06 RX ADMIN — LIDOCAINE HYDROCHLORIDE 15 ML: 20 SOLUTION ORAL; TOPICAL at 01:08

## 2022-08-06 NOTE — ASSESSMENT & PLAN NOTE
Contributing Nutrition Diagnosis  Inadadequate oral intake    Related to (etiology):   AMS    Signs and Symptoms (as evidenced by):   Pt admitted with AMS, intermittently NPO. Now receiving regular diet.     Interventions:  Collaboration with other providers  Commercial Beverage- Boost Plus TID    Nutrition Diagnosis Status:   New

## 2022-08-06 NOTE — PLAN OF CARE
During my shift, pt AAOx4, NAD. Neuro assessment intact. Pt answers all questions appropriately. Clear speech. No swallowing difficulties though pt reports discomfort with swallowing. New order for GI cocktail. Pt tolerated that and pills whole. Benadryl given for insomnia. Pt reports she was told a few days ago that she needs a liver transplant so she has been advised not to take Tylenol. VS stable. Pt remains afebrile. SPO2 wnl on room air. Pt ambulates in room without difficulty. Remains free from falls or difficulty. Bed is lowered and locked. Call light is within reach. Pt has no known needs at this time.

## 2022-08-06 NOTE — PLAN OF CARE
Recommendation:   1. Addition of cardiac diet as tolerated.   2. Addition of Boost Plus BID to supplement intake.   3. Monitor weight/labs.   4. RD to follow and monitor intake    Goals:   Pt intake >/= 50% EEN/EPN by RD follow up    Nutrition Goal Status: new  Communication of RD Recs: other (comment) (POC)      Problem: Oral Intake Inadequate  Goal: Improved Oral Intake  Outcome: Ongoing, Progressing

## 2022-08-06 NOTE — PLAN OF CARE
Met with patient at bedside - agrees with recommended outpatient PT/OT - PA placed ambulatory referral - patient aware to expect a call from  - denied need for any DME - daughter will provide transportation home    Active with Medicare & [employer] - provided Medicare card - awaiting new [employer] card - insurance info emailed to AdmittingRequest & UR Team       08/06/22 2670   Final Note   Assessment Type Final Discharge Note   Anticipated Discharge Disposition Home   What phone number can be called within the next 1-3 days to see how you are doing after discharge? [phone]   Hospital Resources/Appts/Education Provided Provided patient/caregiver with written discharge plan information

## 2022-08-06 NOTE — CONSULTS
"  [facility] ([place])  Adult Nutrition  Consult Note    SUMMARY     Recommendations    Recommendation:   1. Addition of cardiac diet as tolerated.   2. Addition of Boost Plus BID to supplement intake.   3. Monitor weight/labs.   4. RD to follow and monitor intake    Goals:   Pt intake >/= 50% EEN/EPN by RD follow up    Nutrition Goal Status: new  Communication of RD Recs: other (comment) (POC)    Assessment and Plan    * Altered mental status  Contributing Nutrition Diagnosis  Inadadequate oral intake    Related to (etiology):   AMS    Signs and Symptoms (as evidenced by):   Pt admitted with AMS, intermittently NPO. Now receiving regular diet.     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 Per chart admited with slurred speech, visual hallucinations, abdominal pain, confusion, and sleep disturbance. PIV. Adria Score: 21 NFPE not completed 2/2 remote coverage  Nutrition Discharge Planning: d/c on cardiac diet    Nutrition Risk Screen    Nutrition Risk Screen: no indicators present    Nutrition/Diet History    Food Preferences: MIKE  Spiritual, Cultural Beliefs, Episcopal Practices, Values that Affect Care: no  Food Allergies: NKFA  Factors Affecting Nutritional Intake: impaired cognitive status/motor control, abdominal pain    Anthropometrics    Temp: 98.4 °F (36.9 °C)  Height Method: Stated  Height: 5' 2" (157.5 cm)  Height (inches): 62 in  Weight Method: Bed Scale  Weight: 70.8 kg (156 lb)  Weight (lb): 156 lb  Ideal Body Weight (IBW), Female: 110 lb  % Ideal Body Weight, Female (lb): 141.82 %  BMI (Calculated): 28.5  BMI Grade: 25 - " 29.9 - overweight       Lab/Procedures/Meds    Pertinent Labs Reviewed: reviewed  Pertinent Labs Comments: Glu 157, Alb 3.2, Bili 1.2, HDL 39, A1C 5.7  Pertinent Medications Reviewed: reviewed  Pertinent Medications Comments: aspirin, statin, escitalopram oxalate, montelukast, MVI, pantoprazole, poly glycol, sucralfate, thiamine      Estimated/Assessed Needs    Weight Used For Calorie Calculations: 70.8 kg (156 lb 1.4 oz)  Energy Calorie Requirements (kcal): 1536  Energy Need Method: [place]-St Jeor (x1.3)  Protein Requirements: 71 (1.0 gm/kg)  Weight Used For Protein Calculations: 70.8 kg (156 lb 1.4 oz)     Estimated Fluid Requirement Method: RDA Method (or PER MD)  RDA Method (mL): 1536         Nutrition Prescription Ordered    Current Diet Order: Regular    Evaluation of Received Nutrient/Fluid Intake    I/O: N/A  Energy Calories Required: not meeting needs  Protein Required: not meeting needs  Fluid Required: not meeting needs  Comments: LBM 8/4  % Intake of Estimated Energy Needs: 0 - 25 %  % Meal Intake: Other: MIKE    Nutrition Risk    Level of Risk/Frequency of Follow-up:  (2x/week)       Monitor and Evaluation    Food and Nutrient Intake: energy intake, food and beverage intake  Food and Nutrient Adminstration: diet order  Knowledge/Beliefs/Attitudes: food and nutrition knowledge/skill  Physical Activity and Function: nutrition-related ADLs and IADLs  Anthropometric Measurements: weight, weight change, body mass index  Biochemical Data, Medical Tests and Procedures: electrolyte and renal panel, gastrointestinal profile, glucose/endocrine profile, inflammatory profile, lipid profile       Nutrition Follow-Up    RD Follow-up?: Yes

## 2022-08-07 NOTE — DISCHARGE SUMMARY
"[facility] Surg ([facility] Medicine  Discharge Summary      Patient Name: Margret Dykes  MRN: [id number]  Patient Class: OP- Observation  Admission Date: 8/4/2022  Hospital Length of Stay: 0 days  Discharge Date and Time: 8/6/2022 12:00 PM  Attending Physician: No att. 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 She describes the hallucinations as "trippy" like being on "hallucinogenics". At time of my exam her slurred speech has resolved and she denies hallucinations. She does take Ambien to sleep from time to time but says she has not taken Ambien in about 5 days. Her daughter however is concerned that she mismanaged her medications and accidentally took Ambien and Trazodone. Ms. Dykes denies alcohol or drug use. Though her UDS is + for opioids, amphetamines (adderall), THC. She says she received IV pain medications at a different hospital yesterday. She also reports chronic shortness of breath, indigestion      * No surgery found *      Hospital Course:   Margret Dykes was admitted for encephalopathy, suspected secondary to medication. CTA head and neck and MRI brain without evidence of stroke. CTA chest for reported chest pain without evidence of PE in the pulmonary trunk or main pulmonary arteries. TTE unremarkable. Mentation improved, at baseline. Suspect confusion from combination of trazodone, ambien; discontinued at dc. Stable for d/c with referral to hepatology, primary care, surgery for hiatal hernia. Return precautions discussed, no further questions       Goals of Care Treatment Preferences:  Code Status: Full Code      Consults:   Consults (From admission, onward)        Status Ordering Provider     Inpatient consult to Registered Dietitian/Nutritionist  Once      "   Provider:  (Not yet assigned)    Completed SHERICE DUFF     IP consult to case management/social work  Once        Provider:  (Not yet assigned)    Completed SHERICE DUFF          * Altered mental status  Patient presented with visual hallucinations, slurred speech; resolved. CTH, CTA, MRI brain all without evidence of acute etiology. Slurred speech and hallucinations resolved, patient AO x4 currently    UDS + opioid, amphetamine, THC  Daughter reports patient has a bottle of Trazodone and Ambien and is concerned her mother mismanaged her medications  Vasc Neuro consulted: Recommend aspirin if ok with hepatology team- will defer to outpt hepatology  SLP cleared for regular diet  PTOT pending: outpatient PTOT  TTE unremarkable  Mentation improved and at baseline at d/c, stable for with referral to primary care for establishment; stop trazodone and ambien         Final Active Diagnoses:    Diagnosis Date Noted POA    PRINCIPAL PROBLEM:  Altered mental status [R41.82] 08/05/2022 Yes    GERD (gastroesophageal reflux disease) [K21.9] 08/05/2022 Yes    Hiatal hernia [K44.9] 08/05/2022 Yes    Thrombocytopenia [D69.6] 12/11/2016 Yes    Cirrhosis [K74.60] 12/04/2013 Yes    ETOH abuse [F10.10] 12/04/2013 Yes    Hepatitis C [B19.20] 12/04/2013 Yes      Problems Resolved During this Admission:       Discharged Condition: stable    Disposition: Home or Self Care    Follow Up:    Patient Instructions:      Ambulatory referral/consult to Hepatology   Standing Status: Future   Referral Priority: Routine Referral Type: Consultation   Referral Reason: Specialty Services Required   Requested Specialty: Hepatology   Number of Visits Requested: 1     Ambulatory referral/consult to General Surgery   Standing Status: Future   Referral Priority: Routine Referral Type: Consultation   Referral Reason: Specialty Services Required   Requested Specialty: General Surgery   Number of Visits Requested: 1     Ambulatory  referral/consult to Internal Medicine   Standing Status: Future   Referral Priority: Routine Referral Type: Consultation   Referral Reason: Specialty Services Required   Requested Specialty: Internal Medicine   Number of Visits Requested: 1     Ambulatory referral/consult to Physical/Occupational Therapy   Standing Status: Future   Referral Priority: Routine Referral Type: Physical Medicine   Referral Reason: Specialty Services Required   Number of Visits Requested: 1     Ambulatory referral/consult to Gastroenterology   Standing Status: Future   Referral Priority: Routine Referral Type: Consultation   Referral Reason: Specialty Services Required   Requested Specialty: Gastroenterology   Number of Visits Requested: 1     Ambulatory referral/consult to [facility]   Standing Status: Future   Referral Priority: Routine Referral Type: Consultation   Referral Reason: Specialty Services Required   Number of Visits Requested: 1       Significant Diagnostic Studies: Radiology: CT scan: CTA: Indeterminate for acute PE.  No pulmonary emboli in the pulmonary trunk or main pulmonary arteries.  MRI brain: Involutional and chronic small vessel gliotic signal changes throughout the brain.  Correlate for history of demyelination.  Cardiac Graphics: Echocardiogram:   Transthoracic echo (TTE) complete (Cupid Only):   Results for orders placed or performed during the hospital encounter of 08/04/22   Echo   Result Value Ref Range    BSA 1.76 m2    TDI SEPTAL 0.08 m/s    LV LATERAL E/E' RATIO 8.67 m/s    LV SEPTAL E/E' RATIO 9.75 m/s    LA WIDTH 4.21 cm    TDI LATERAL 0.09 m/s    PV PEAK VELOCITY 0.69 cm/s    LVIDd 4.77 3.5 - 6.0 cm    IVS 0.67 0.6 - 1.1 cm    Posterior Wall 0.72 0.6 - 1.1 cm    LVIDs 2.51 2.1 - 4.0 cm    FS 47 28 - 44 %    LA volume 47.95 cm3    Sinus 2.99 cm    STJ 2.59 cm    Ascending aorta 2.40 cm    LV mass 104.78 g    LA size 2.83 cm    TAPSE 1.87 cm    Left Ventricle Relative Wall  Thickness 0.30 cm    AV mean gradient 3 mmHg    AV valve area 3.06 cm2    AV Velocity Ratio 0.86     AV index (prosthetic) 0.98     MV valve area p 1/2 method 5.35 cm2    E/A ratio 1.07     Mean e' 0.09 m/s    E wave deceleration time 140.97 msec    IVRT 68.51 msec    LVOT diameter 1.99 cm    LVOT area 3.1 cm2    LVOT peak layo 1.07 m/s    LVOT peak VTI 28.18 cm    Ao peak layo 1.24 m/s    Ao VTI 28.62 cm    Mr max layo 0.06 m/s    LVOT stroke volume 87.60 cm3    AV peak gradient 6 mmHg    E/E' ratio 9.18 m/s    MV Peak E Layo 0.78 m/s    TR Max Layo 2.36 m/s    MV stenosis pressure 1/2 time 41.14 ms    MV Peak A Layo 0.73 m/s    PV Peak S Layo 0.83 m/s    LV Systolic Volume 22.57 mL    LV Systolic Volume Index 13.1 mL/m2    LV Diastolic Volume 106.17 mL    LV Diastolic Volume Index 61.73 mL/m2    LA Volume Index 27.9 mL/m2    LV Mass Index 61 g/m2    RA Major Axis 4.26 cm    Left Atrium Minor Axis 4.69 cm    Left Atrium Major Axis 4.78 cm    Triscuspid Valve Regurgitation Peak Gradient 22 mmHg    LA Volume Index (Mod) 34.3 mL/m2    LA volume (mod) 59.00 cm3    RA Width 3.20 cm    Right Atrial Pressure (from IVC) 3 mmHg    EF 70 %    TV rest pulmonary artery pressure 25 mmHg    Narrative    · The left ventricle is normal in size with normal systolic function.  · The estimated ejection fraction is 70%.  · Normal left ventricular diastolic function.  · Normal right ventricular size with normal right ventricular systolic   function.  · Mild aortic valve sclerosis.  · The estimated PA systolic pressure is 25 mmHg.  · Normal central venous pressure (3 mmHg).          Pending Diagnostic Studies:     None         Medications:  Reconciled Home Medications:      Medication List      CHANGE how you take these medications    * sucralfate 1 gram tablet  Commonly known as: CARAFATE  Take 1 tablet (1 g total) by mouth 4 (four) times daily before meals and nightly.  What changed: You were already taking a medication with the same name,  and this prescription was added. Make sure you understand how and when to take each.     * sucralfate 1 gram tablet  Commonly known as: CARAFATE  Take 1 g by mouth.  What changed: Another medication with the same name was added. Make sure you understand how and when to take each.         * This list has 2 medication(s) that are the same as other medications prescribed for you. Read the directions carefully, and ask your doctor or other care provider to review them with you.            CONTINUE taking these medications    dextroamphetamine-amphetamine 15 mg tablet  Commonly known as: ADDERALL  Take 15 mg by mouth 2 (two) times daily.     EScitalopram oxalate 20 MG tablet  Commonly known as: LEXAPRO  Take 20 mg by mouth every morning.     famotidine 20 MG tablet  Commonly known as: PEPCID  Take 20 mg by mouth every morning.     ferrous sulfate 325 (65 FE) MG EC tablet  Take by mouth once daily.     hydrocortisone 2.5 % rectal cream  Commonly known as: ANUSOL-HC  SMARTSIG:Rectally 4 Times Daily     montelukast 10 mg tablet  Commonly known as: SINGULAIR  Take 10 mg by mouth once daily.     multivitamin tablet  Commonly known as: THERAGRAN  Take 1 tablet by mouth once daily.     ondansetron 4 MG tablet  Commonly known as: ZOFRAN  Take 4 mg by mouth every 8 (eight) hours as needed.     pantoprazole 40 MG tablet  Commonly known as: PROTONIX  Take 40 mg by mouth 2 (two) times daily.     thiamine 100 MG tablet  Take 100 mg by mouth once daily.     traMADoL 50 mg tablet  Commonly known as: ULTRAM  Take 50 mg by mouth every 4 (four) hours as needed.        STOP taking these medications    zolpidem 5 MG Tab  Commonly known as: AMBIEN            Indwelling Lines/Drains at time of discharge:   Lines/Drains/Airways     None                 Time spent on the discharge of patient: >35 minutes         Rosa Maria Black PA-C  Department of Hospital Medicine  [facility])

## 2022-08-07 NOTE — ASSESSMENT & PLAN NOTE
Patient presented with visual hallucinations, slurred speech; resolved. CTH, CTA, MRI brain all without evidence of acute etiology. Slurred speech and hallucinations resolved, patient AO x4 currently    UDS + opioid, amphetamine, THC  Daughter reports patient has a bottle of Trazodone and Ambien and is concerned her mother mismanaged her medications  [facility] Neuro consulted: Recommend aspirin if ok with hepatology team- will defer to outpt hepatology  SLP cleared for regular diet  PTOT pending: outpatient PTOT  TTE unremarkable  Mentation improved and at baseline at d/c, stable for with referral to primary care for establishment; stop trazodone and ambien

## 2022-08-07 NOTE — HOSPITAL COURSE
Margret Dykes was admitted for encephalopathy, suspected secondary to medication. CTA head and neck and MRI brain without evidence of stroke. CTA chest for reported chest pain without evidence of PE in the pulmonary trunk or main pulmonary arteries. TTE unremarkable. Mentation improved, at baseline. Suspect confusion from combination of trazodone, ambien; discontinued at dc. Stable for d/c with referral to hepatology, primary care, surgery for hiatal hernia. Return precautions discussed, no further questions

## 2022-08-08 ENCOUNTER — TELEPHONE (OUTPATIENT)
Dept: HEPATOLOGY | Facility: CLINIC | Age: 72
End: 2022-08-08
Payer: MEDICARE

## 2022-08-08 ENCOUNTER — TELEPHONE (OUTPATIENT)
Dept: INTERNAL MEDICINE | Facility: CLINIC | Age: 72
End: 2022-08-08
Payer: MEDICARE

## 2022-08-08 NOTE — TELEPHONE ENCOUNTER
Rosa Maria Black PA-C ordered that patient be scheduled for a hep c/cirrhosis consult visit.  See CE for positive quant in 2013 but quant negative in 2016.  I spoke with patient's daughter Judson and she states that her mom was treated already for hep c.  Patient scheduled for a visit with Dr. Lara on 8/11/22; appt reminder notice mailed.

## 2022-08-08 NOTE — TELEPHONE ENCOUNTER
----- Message from Rich Cueto sent at 8/5/2022  4:08 PM CDT -----       Type: Patient Returning Call    Who Called: Dutch Viera,    Who Left Message for Patient: NA  Does the patient know what this is regarding?: Patient is being discharged today or tomorrow, but they want to verify that the provider accepts Medicare Part A&B.  Would the patient rather a call back or a response via [facility]? Call  Best Call Back Number: [phone]  Additional Information: Please assist, thank you!

## 2022-08-11 ENCOUNTER — LAB VISIT (OUTPATIENT)
Dept: LAB | Facility: HOSPITAL | Age: 72
End: 2022-08-11
Attending: INTERNAL MEDICINE
Payer: MEDICARE

## 2022-08-11 ENCOUNTER — OFFICE VISIT (OUTPATIENT)
Dept: HEPATOLOGY | Facility: CLINIC | Age: 72
End: 2022-08-11
Payer: MEDICARE

## 2022-08-11 ENCOUNTER — PES CALL (OUTPATIENT)
Dept: ADMINISTRATIVE | Facility: CLINIC | Age: 72
End: 2022-08-11
Payer: MEDICARE

## 2022-08-11 VITALS
TEMPERATURE: 99 F | DIASTOLIC BLOOD PRESSURE: 70 MMHG | WEIGHT: 149.13 LBS | HEIGHT: 62 IN | RESPIRATION RATE: 19 BRPM | SYSTOLIC BLOOD PRESSURE: 121 MMHG | BODY MASS INDEX: 27.44 KG/M2 | OXYGEN SATURATION: 100 % | HEART RATE: 106 BPM

## 2022-08-11 DIAGNOSIS — F10.10 ETOH ABUSE: ICD-10-CM

## 2022-08-11 DIAGNOSIS — F32.89 OTHER DEPRESSION: ICD-10-CM

## 2022-08-11 DIAGNOSIS — B19.20 HEPATITIS C VIRUS INFECTION WITHOUT HEPATIC COMA, UNSPECIFIED CHRONICITY: ICD-10-CM

## 2022-08-11 DIAGNOSIS — K76.82 HEPATIC ENCEPHALOPATHY: ICD-10-CM

## 2022-08-11 DIAGNOSIS — I95.2 HYPOTENSION DUE TO DRUGS: ICD-10-CM

## 2022-08-11 DIAGNOSIS — D69.6 THROMBOCYTOPENIA: Primary | ICD-10-CM

## 2022-08-11 DIAGNOSIS — I85.10 SECONDARY ESOPHAGEAL VARICES WITHOUT BLEEDING: ICD-10-CM

## 2022-08-11 DIAGNOSIS — K74.60 HEPATIC CIRRHOSIS, UNSPECIFIED HEPATIC CIRRHOSIS TYPE, UNSPECIFIED WHETHER ASCITES PRESENT: ICD-10-CM

## 2022-08-11 DIAGNOSIS — K21.00 GASTROESOPHAGEAL REFLUX DISEASE WITH ESOPHAGITIS WITHOUT HEMORRHAGE: ICD-10-CM

## 2022-08-11 LAB
AFP SERPL-MCNC: 8.2 NG/ML (ref 0–8.4)
ALBUMIN SERPL BCP-MCNC: 3.4 G/DL (ref 3.5–5.2)
ALP SERPL-CCNC: 88 U/L (ref 55–135)
ALT SERPL W/O P-5'-P-CCNC: 22 U/L (ref 10–44)
ANION GAP SERPL CALC-SCNC: 9 MMOL/L (ref 8–16)
AST SERPL-CCNC: 31 U/L (ref 10–40)
BASOPHILS # BLD AUTO: 0.03 K/UL (ref 0–0.2)
BASOPHILS NFR BLD: 0.7 % (ref 0–1.9)
BILIRUB SERPL-MCNC: 0.9 MG/DL (ref 0.1–1)
BUN SERPL-MCNC: 9 MG/DL (ref 8–23)
CALCIUM SERPL-MCNC: 8.7 MG/DL (ref 8.7–10.5)
CHLORIDE SERPL-SCNC: 110 MMOL/L (ref 95–110)
CO2 SERPL-SCNC: 20 MMOL/L (ref 23–29)
CREAT SERPL-MCNC: 0.8 MG/DL (ref 0.5–1.4)
DIFFERENTIAL METHOD: ABNORMAL
EOSINOPHIL # BLD AUTO: 0.1 K/UL (ref 0–0.5)
EOSINOPHIL NFR BLD: 3.3 % (ref 0–8)
ERYTHROCYTE [DISTWIDTH] IN BLOOD BY AUTOMATED COUNT: 19.5 % (ref 11.5–14.5)
EST. GFR  (NO RACE VARIABLE): >60 ML/MIN/1.73 M^2
FERRITIN SERPL-MCNC: 16 NG/ML (ref 20–300)
GLUCOSE SERPL-MCNC: 137 MG/DL (ref 70–110)
HCT VFR BLD AUTO: 35.4 % (ref 37–48.5)
HGB BLD-MCNC: 10.6 G/DL (ref 12–16)
IMM GRANULOCYTES # BLD AUTO: 0.01 K/UL (ref 0–0.04)
IMM GRANULOCYTES NFR BLD AUTO: 0.2 % (ref 0–0.5)
INR PPP: 1.2 (ref 0.8–1.2)
IRON SERPL-MCNC: 36 UG/DL (ref 30–160)
LYMPHOCYTES # BLD AUTO: 1.3 K/UL (ref 1–4.8)
LYMPHOCYTES NFR BLD: 31 % (ref 18–48)
MCH RBC QN AUTO: 24.8 PG (ref 27–31)
MCHC RBC AUTO-ENTMCNC: 29.9 G/DL (ref 32–36)
MCV RBC AUTO: 83 FL (ref 82–98)
MONOCYTES # BLD AUTO: 0.3 K/UL (ref 0.3–1)
MONOCYTES NFR BLD: 7.8 % (ref 4–15)
NEUTROPHILS # BLD AUTO: 2.4 K/UL (ref 1.8–7.7)
NEUTROPHILS NFR BLD: 57 % (ref 38–73)
NRBC BLD-RTO: 0 /100 WBC
PLATELET # BLD AUTO: 83 K/UL (ref 150–450)
PMV BLD AUTO: 11.3 FL (ref 9.2–12.9)
POCT GLUCOSE: 152 MG/DL (ref 70–110)
POTASSIUM SERPL-SCNC: 4.2 MMOL/L (ref 3.5–5.1)
PROT SERPL-MCNC: 6.6 G/DL (ref 6–8.4)
PROTHROMBIN TIME: 12.9 SEC (ref 9–12.5)
RBC # BLD AUTO: 4.27 M/UL (ref 4–5.4)
SATURATED IRON: 7 % (ref 20–50)
SODIUM SERPL-SCNC: 139 MMOL/L (ref 136–145)
TOTAL IRON BINDING CAPACITY: 499 UG/DL (ref 250–450)
TRANSFERRIN SERPL-MCNC: 337 MG/DL (ref 200–375)
WBC # BLD AUTO: 4.23 K/UL (ref 3.9–12.7)

## 2022-08-11 PROCEDURE — 99204 OFFICE O/P NEW MOD 45 MIN: CPT | Mod: S$GLB,,, | Performed by: INTERNAL MEDICINE

## 2022-08-11 PROCEDURE — 99999 PR PBB SHADOW E&M-EST. PATIENT-LVL V: CPT | Mod: PBBFAC,,, | Performed by: INTERNAL MEDICINE

## 2022-08-11 PROCEDURE — 82728 ASSAY OF FERRITIN: CPT | Performed by: INTERNAL MEDICINE

## 2022-08-11 PROCEDURE — 86803 HEPATITIS C AB TEST: CPT | Performed by: INTERNAL MEDICINE

## 2022-08-11 PROCEDURE — 87340 HEPATITIS B SURFACE AG IA: CPT | Performed by: INTERNAL MEDICINE

## 2022-08-11 PROCEDURE — 84466 ASSAY OF TRANSFERRIN: CPT | Performed by: INTERNAL MEDICINE

## 2022-08-11 PROCEDURE — 86790 VIRUS ANTIBODY NOS: CPT | Performed by: INTERNAL MEDICINE

## 2022-08-11 PROCEDURE — 86704 HEP B CORE ANTIBODY TOTAL: CPT | Performed by: INTERNAL MEDICINE

## 2022-08-11 PROCEDURE — 82105 ALPHA-FETOPROTEIN SERUM: CPT | Performed by: INTERNAL MEDICINE

## 2022-08-11 PROCEDURE — 80321 ALCOHOLS BIOMARKERS 1OR 2: CPT | Performed by: INTERNAL MEDICINE

## 2022-08-11 PROCEDURE — 80053 COMPREHEN METABOLIC PANEL: CPT | Performed by: INTERNAL MEDICINE

## 2022-08-11 PROCEDURE — 99215 OFFICE O/P EST HI 40 MIN: CPT | Mod: PBBFAC,PN | Performed by: INTERNAL MEDICINE

## 2022-08-11 PROCEDURE — 99999 PR PBB SHADOW E&M-EST. PATIENT-LVL V: ICD-10-PCS | Mod: PBBFAC,,, | Performed by: INTERNAL MEDICINE

## 2022-08-11 PROCEDURE — 85610 PROTHROMBIN TIME: CPT | Performed by: INTERNAL MEDICINE

## 2022-08-11 PROCEDURE — 99204 PR OFFICE/OUTPT VISIT, NEW, LEVL IV, 45-59 MIN: ICD-10-PCS | Mod: S$GLB,,, | Performed by: INTERNAL MEDICINE

## 2022-08-11 PROCEDURE — 85025 COMPLETE CBC W/AUTO DIFF WBC: CPT | Performed by: INTERNAL MEDICINE

## 2022-08-11 PROCEDURE — 86706 HEP B SURFACE ANTIBODY: CPT | Performed by: INTERNAL MEDICINE

## 2022-08-11 RX ORDER — ESCITALOPRAM OXALATE 20 MG/1
20 TABLET ORAL EVERY MORNING
Qty: 30 TABLET | Refills: 0 | Status: SHIPPED | OUTPATIENT
Start: 2022-08-11

## 2022-08-11 RX ORDER — NEBULIZER AND COMPRESSOR
1 EACH MISCELLANEOUS 2 TIMES DAILY
Qty: 1 EACH | Refills: 0 | COMMUNITY
Start: 2022-08-11

## 2022-08-11 RX ORDER — LACTULOSE 10 G/15ML
30 SOLUTION ORAL; RECTAL 2 TIMES DAILY
COMMUNITY
Start: 2022-02-15

## 2022-08-11 NOTE — PROGRESS NOTES
HEPATOLOGY CONSULTATION    Referring Physician: Rosa Maria Black PA-C  Current Corresponding Physician: Rosa Maria Black PA-C    Reason for Consultation: Consultation for evaluation of cirrhosis and alcohol and hepatitis C    History of Present Illness: Margret Dykes is a 72 y.o. 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    Admission at [facility] 8/4/22-8/6/22: AMS -? Secondary to polypharmacy- ambien, trazodone? tox screen positive for opioids, ampehtamines (adderrall), THC. Pt denies narcotic use. Ammonia level was only 27. Treated like she had HE exacerbation due to meds. Had CTA head and neck and MRI brain wo evidence of stroke. CTA chest -ve for PE.     Pt by report was evaluated for liver transplant in the past by [facility] but not listed due to improvement in liver function. She no longer drinks alcohol and has been treated and cured of HCV.    Currently she has no excess fluid (ascites or edema). She is not taking lactulose due to excess BMs and incontinence and did not get a Rx for rifaximin upon discharge and thus is not taking this med either. She is not completely sure about her meds and is c/o fatigue-sleeping all night and during the day. She is also c/o retrosternal burning and is enquiring She is forgetful- missed f/u with Dr Gates today (does not have my chart). Currently living with her daughter in [facility] while her liver/medical conditions are evaluated.    Apparently had a GI bleed last month at [facility] and was transfused 7 units of PRBC. A physician in [place] recommended a liver tx evaluation after this hosp. I do not have any details re this admission. Has propranolol, but not taking it at present. She is concerned re her BP.    Abdo US 5/21/21: cirrhosis; no mass lesions; no ascites  EGD 7/26/18: no varices left  Colonoscopy 7/26/18: small polyp; repeat  recommended 2023    MELD-Na score: 8 at 8/6/2022  3:21 AM  MELD score: 8 at 8/6/2022  3:21 AM  Calculated from:  Serum Creatinine: 0.8 mg/dL (Using min of 1 mg/dL) at 8/6/2022  3:21 AM  Serum Sodium: 138 mmol/L (Using max of 137 mmol/L) at 8/6/2022  3:21 AM  Total Bilirubin: 0.6 mg/dL (Using min of 1 mg/dL) at 8/6/2022  3:21 AM  INR(ratio): 1.2 at 8/4/2022 10:43 PM  Age: 72 years    No past medical history on file.  Outpatient Encounter Medications as of 8/11/2022   Medication Sig Dispense Refill    dextroamphetamine-amphetamine (ADDERALL) 15 mg tablet Take 15 mg by mouth 2 (two) times daily.      EScitalopram oxalate (LEXAPRO) 20 MG tablet Take 20 mg by mouth every morning.      famotidine (PEPCID) 20 MG tablet Take 20 mg by mouth every morning.      ferrous sulfate 325 (65 FE) MG EC tablet Take by mouth once daily.      hydrocortisone (ANUSOL-HC) 2.5 % rectal cream SMARTSIG:Rectally 4 Times Daily      montelukast (SINGULAIR) 10 mg tablet Take 10 mg by mouth once daily.      multivitamin (THERAGRAN) tablet Take 1 tablet by mouth once daily.      ondansetron (ZOFRAN) 4 MG tablet Take 4 mg by mouth every 8 (eight) hours as needed.      pantoprazole (PROTONIX) 40 MG tablet Take 40 mg by mouth 2 (two) times daily.      sucralfate (CARAFATE) 1 gram tablet Take 1 g by mouth.      sucralfate (CARAFATE) 1 gram tablet Take 1 tablet (1 g total) by mouth 4 (four) times daily before meals and nightly. 120 tablet 2    thiamine 100 MG tablet Take 100 mg by mouth once daily.      traMADoL (ULTRAM) 50 mg tablet Take 50 mg by mouth every 4 (four) hours as needed.      [DISCONTINUED] zolpidem (AMBIEN) 5 MG Tab Take 5 mg by mouth nightly as needed.       No facility-administered encounter medications on file as of 8/11/2022.     Review of patient's allergies indicates:   Allergen Reactions    Compazine [prochlorperazine] Nausea And Vomiting     No family history on file.    Social History     Socioeconomic History     Marital status: Single     Social Determinants of Health     Financial Resource Strain: Low Risk     Difficulty of Paying Living Expenses: Not hard at all   Food Insecurity: No Food Insecurity    Worried About Running Out of Food in the Last Year: Never true    Ran Out of Food in the Last Year: Never true   Transportation Needs: No Transportation Needs    Lack of Transportation (Medical): No    Lack of Transportation (Non-Medical): No   Physical Activity: Inactive    Days of Exercise per Week: 0 days    Minutes of Exercise per Session: 0 min   Stress: No Stress Concern Present    Feeling of Stress : Not at all   Social Connections: Socially Isolated    Frequency of Communication with Friends and Family: More than three times a week    Frequency of Social Gatherings with Friends and Family: More than three times a week    Attends Orthodoxy Services: Never    Active Member of Clubs or Organizations: No    Attends Club or Organization Meetings: Never    Marital Status: Never    Housing Stability: Unknown    Unable to Pay for Housing in the Last Year: No    Unstable Housing in the Last Year: No     Review of Systems   Constitutional: Positive for fatigue.   HENT: Negative.    Eyes: Negative.    Respiratory: Negative.    Cardiovascular: Negative.    Gastrointestinal: Negative.    Genitourinary: Negative.    Musculoskeletal: Negative.    Skin: Negative.    Neurological: Negative.    Psychiatric/Behavioral: Negative.      Vitals:    08/11/22 1040   BP: 121/70   Pulse: 106   Resp: 19   Temp: 98.7 °F (37.1 °C)       Physical Exam  Vitals reviewed.   Constitutional:       Appearance: She is well-developed.   HENT:      Head: Normocephalic and atraumatic.   Eyes:      General: No scleral icterus.     Conjunctiva/sclera: Conjunctivae normal.      Pupils: Pupils are equal, round, and reactive to light.   Neck:      Thyroid: No thyromegaly.   Cardiovascular:      Rate and Rhythm: Normal rate and regular  rhythm.      Heart sounds: Normal heart sounds.   Pulmonary:      Effort: Pulmonary effort is normal.      Breath sounds: Normal breath sounds. No rales.   Abdominal:      General: Bowel sounds are normal. There is no distension.      Palpations: Abdomen is soft. There is no mass.      Tenderness: There is no abdominal tenderness.   Musculoskeletal:         General: Normal range of motion.      Cervical back: Normal range of motion and neck supple.   Skin:     General: Skin is warm and dry.      Findings: No rash.   Neurological:      Mental Status: She is alert and oriented to person, place, and time.         MELD-Na score: 8 at 8/6/2022  3:21 AM  MELD score: 8 at 8/6/2022  3:21 AM  Calculated from:  Serum Creatinine: 0.8 mg/dL (Using min of 1 mg/dL) at 8/6/2022  3:21 AM  Serum Sodium: 138 mmol/L (Using max of 137 mmol/L) at 8/6/2022  3:21 AM  Total Bilirubin: 0.6 mg/dL (Using min of 1 mg/dL) at 8/6/2022  3:21 AM  INR(ratio): 1.2 at 8/4/2022 10:43 PM  Age: 72 years    Lab Results   Component Value Date     (H) 08/06/2022    BUN 12 08/06/2022    CREATININE 0.8 08/06/2022    CALCIUM 7.7 (L) 08/06/2022     08/06/2022    K 4.3 08/06/2022     08/06/2022    PROT 5.2 (L) 08/06/2022    CO2 25 08/06/2022    ANIONGAP 6 (L) 08/06/2022    WBC 5.46 08/05/2022    RBC 3.85 (L) 08/05/2022    HGB 9.3 (L) 08/05/2022    HCT 31.7 (L) 08/05/2022    MCV 82 08/05/2022    MCH 24.2 (L) 08/05/2022    MCHC 29.3 (L) 08/05/2022     Lab Results   Component Value Date    RDW 18.6 (H) 08/05/2022    PLT 71 (L) 08/05/2022    MPV 10.4 08/05/2022    GRAN 3.3 08/05/2022    GRAN 60.6 08/05/2022    LYMPH 1.5 08/05/2022    LYMPH 28.0 08/05/2022    MONO 0.4 08/05/2022    MONO 7.1 08/05/2022    EOSINOPHIL 3.5 08/05/2022    BASOPHIL 0.4 08/05/2022    EOS 0.2 08/05/2022    BASO 0.02 08/05/2022    APTT >150.0 (AA) 08/05/2022    BNP 13 08/04/2022    CHOL 123 08/04/2022    TRIG 54 08/04/2022    HDL 39 (L) 08/04/2022    CHOLHDL 31.7 08/04/2022     TOTALCHOLEST 3.2 08/04/2022    ALBUMIN 2.7 (L) 08/06/2022    AST 27 08/06/2022    ALT 20 08/06/2022    ALKPHOS 89 08/06/2022    MG 2.2 08/06/2022    LABPROT 12.7 (H) 08/04/2022    INR 1.2 08/04/2022       Assessment and Plan:  Margret Dykes is a 72 y.o. female with a hx of alcohol and HCV-induced cirrhosis. She is no longer drinking alcohol and has been cured of HCV. MELD is <15 and thus she appears to be medically early for liver transplant. She may have had a recent variceal bleed. I will obtain records. I will order another EGD to f/u on a hx of varices and GERD, not well controlled. I have counseled her not to have a hiatal hernia repair due to cirrhosis- high risk for bleeding and decompensation. Other recommendations:  1. Cirrhosis: meld labs today and monthly; HCC screening now with AFP and abdo US;   2. Hx chronic HCV: HCV VL to confirm SVR  3. Hx Alcohol, heavy: peth now to confirm no alcohol  3. GERD: continue protonix; defer darrell fundoplication (see above); GI referral  4. Hx varices and possible recent variceal bleed: EGD now; obtain records; start propranolol bid; hold is SBP <100  5. HE, ongoing: start lactulose and rifaximin; avoid mind altering meds- no ambien or tramadol    Return 4 weeks    Addendum  Received notes from 03/22 admission:  --GI bleed, transfusion requiring with shock- EGD 3/20/22 showed severeesophagitis but no varices, PHG, no active bleeding  --intermittent confusion during hosp  --notes comment on drug-seeking behavior-[place]-Rxs for stimulants, ambien, and had frequent Txs for tramadol, norco, hydrocodone  --CTA - no obvious bleeding source; +cirrhosis; mod to large hiatal hernia

## 2022-08-11 NOTE — PATIENT INSTRUCTIONS
Will get previous EGD done last year and will order one now- will give phone number for scheduling  Lactulose and rifaximin  Avoid any mind altering meds  Screen for liver cancer every 6 months including now  Labs today  Get old records- from [facility]  Sing up for my chart  F/u Dr Gates in IM  Take propranolol twice daily-hold if BP <100

## 2022-08-13 LAB — HCV RNA SERPL NAA+PROBE-ACNC: NORMAL IU/ML

## 2022-08-15 ENCOUNTER — OFFICE VISIT (OUTPATIENT)
Dept: SURGERY | Facility: CLINIC | Age: 72
End: 2022-08-15
Payer: MEDICARE

## 2022-08-15 ENCOUNTER — TELEPHONE (OUTPATIENT)
Dept: HEPATOLOGY | Facility: CLINIC | Age: 72
End: 2022-08-15
Payer: MEDICARE

## 2022-08-15 ENCOUNTER — TELEPHONE (OUTPATIENT)
Dept: INTERNAL MEDICINE | Facility: CLINIC | Age: 72
End: 2022-08-15
Payer: MEDICARE

## 2022-08-15 VITALS
BODY MASS INDEX: 27.73 KG/M2 | HEIGHT: 62 IN | SYSTOLIC BLOOD PRESSURE: 130 MMHG | DIASTOLIC BLOOD PRESSURE: 80 MMHG | WEIGHT: 150.69 LBS | HEART RATE: 92 BPM

## 2022-08-15 DIAGNOSIS — K21.00 GASTROESOPHAGEAL REFLUX DISEASE WITH ESOPHAGITIS WITHOUT HEMORRHAGE: ICD-10-CM

## 2022-08-15 DIAGNOSIS — K44.9 HIATAL HERNIA: Primary | ICD-10-CM

## 2022-08-15 DIAGNOSIS — K70.31 ALCOHOLIC CIRRHOSIS OF LIVER WITH ASCITES: ICD-10-CM

## 2022-08-15 LAB
PETH 16:0/18.1 (POPETH): 275 NG/ML
PETH 16:0/18.2 (PLPETH): 146 NG/ML

## 2022-08-15 PROCEDURE — 99205 OFFICE O/P NEW HI 60 MIN: CPT | Mod: S$GLB,,, | Performed by: SURGERY

## 2022-08-15 PROCEDURE — 99999 PR PBB SHADOW E&M-EST. PATIENT-LVL IV: ICD-10-PCS | Mod: PBBFAC,,, | Performed by: SURGERY

## 2022-08-15 PROCEDURE — 99214 OFFICE O/P EST MOD 30 MIN: CPT | Mod: PBBFAC | Performed by: SURGERY

## 2022-08-15 PROCEDURE — 99999 PR PBB SHADOW E&M-EST. PATIENT-LVL IV: CPT | Mod: PBBFAC,,, | Performed by: SURGERY

## 2022-08-15 PROCEDURE — 99205 PR OFFICE/OUTPT VISIT, NEW, LEVL V, 60-74 MIN: ICD-10-PCS | Mod: S$GLB,,, | Performed by: SURGERY

## 2022-08-15 RX ORDER — PROPRANOLOL HYDROCHLORIDE 20 MG/1
20 TABLET ORAL 3 TIMES DAILY
COMMUNITY

## 2022-08-15 NOTE — TELEPHONE ENCOUNTER
Carrie Lara MD sent to [facility] Staff  MELD-Na score: 8   Tumor marker negative-let pt know   No answer

## 2022-08-15 NOTE — TELEPHONE ENCOUNTER
----- Message from Yasmeen Esquivel MA sent at 8/11/2022 11:40 AM CDT -----  Regarding: missed appt  Please call pts daughter to reschedule this appt [phone]    Thank you per Dr. Lara

## 2022-08-15 NOTE — MEDICAL/APP STUDENT
Subjective:       Patient ID: Margret Dykes is a 72 y.o. female.    Chief Complaint: Pain    Patient consults in company of her daughter, personal history of hep c, esophageal varices and chyrrosis. referred to surgery by hepatologist. Consults for hiatal hernia asking for second opinion since, according to daughter, was told by hepatology she wasn't a candidate for surgery. she's getting an endoscopy done on Thursday ordered by [facility] for previous diagnosis.     Pain  This is a chronic problem. The current episode started more than 1 year ago. The problem occurs 2 to 4 times per day. The problem has been unchanged. Associated symptoms include abdominal pain, chest pain, coughing and fatigue. Pertinent negatives include no change in bowel habit, fever or vomiting. The symptoms are aggravated by stress, exertion and eating. Treatments tried: antiacids  The treatment provided mild relief.     Review of Systems   Constitutional: Positive for fatigue. Negative for fever.        NYHA class 3    HENT:        No bleeding gums when brushing teeth    Respiratory: Positive for cough, chest tightness and shortness of breath.    Cardiovascular: Positive for chest pain.   Gastrointestinal: Positive for abdominal pain and reflux. Negative for anal bleeding, blood in stool, change in bowel habit, diarrhea, vomiting and change in bowel habit.   Integumentary:  Positive for mole/lesion.        Easily bruised   Hematological: Bruises/bleeds easily.         Objective:      Physical Exam  Vitals and nursing note reviewed.   Constitutional:       Appearance: Normal appearance. She is normal weight.   HENT:      Head: Normocephalic.      Nose: Nose normal.      Mouth/Throat:      Pharynx: Oropharynx is clear.   Eyes:      Extraocular Movements: Extraocular movements intact.      Conjunctiva/sclera: Conjunctivae normal.      Pupils: Pupils are equal, round, and reactive to light.   Cardiovascular:      Rate and Rhythm: Normal rate and  regular rhythm.      Pulses: Normal pulses.      Heart sounds: Normal heart sounds.   Pulmonary:      Effort: Pulmonary effort is normal.      Breath sounds: Normal breath sounds.   Abdominal:      General: Abdomen is protuberant. Bowel sounds are normal. There is distension.      Palpations: Abdomen is soft.      Tenderness: There is abdominal tenderness in the epigastric area. There is guarding.       Musculoskeletal:      Cervical back: Normal range of motion.   Neurological:      Mental Status: She is alert.         Assessment:       Problem List Items Addressed This Visit        GI    Hiatal hernia          Plan:    plan to have patient evaluated by hepatology to determine if patient is surgical candidate, if so, we will evaluate further work up.

## 2022-08-15 NOTE — PROGRESS NOTES
MELD-Na score: 8 at 8/11/2022 11:59 AM  MELD score: 8 at 8/11/2022 11:59 AM  Calculated from:  Serum Creatinine: 0.8 mg/dL (Using min of 1 mg/dL) at 8/11/2022 11:59 AM  Serum Sodium: 139 mmol/L (Using max of 137 mmol/L) at 8/11/2022 11:59 AM  Total Bilirubin: 0.9 mg/dL (Using min of 1 mg/dL) at 8/11/2022 11:59 AM  INR(ratio): 1.2 at 8/11/2022 11:59 AM  Age: 72 years     Refill request for Davi    Pt last seen 4/7/17  Last filled 9/21/16 with #30 and 5RF    ePDMP reviewed and good.     Please advise.

## 2022-08-16 LAB
HAV IGG SER QL IA: POSITIVE
HBV CORE AB SERPL QL IA: POSITIVE
HBV SURFACE AB SER-ACNC: POSITIVE M[IU]/ML
HBV SURFACE AG SERPL QL IA: NEGATIVE
HCV AB SERPL QL IA: POSITIVE

## 2022-08-17 ENCOUNTER — TELEPHONE (OUTPATIENT)
Dept: HEPATOLOGY | Facility: CLINIC | Age: 72
End: 2022-08-17
Payer: MEDICARE

## 2022-08-17 ENCOUNTER — NURSE TRIAGE (OUTPATIENT)
Dept: ADMINISTRATIVE | Facility: CLINIC | Age: 72
End: 2022-08-17
Payer: MEDICARE

## 2022-08-17 NOTE — TELEPHONE ENCOUNTER
---- Message from Dr Carrie Lara -----  Please call pt and let her know peth suggests heavy alcohol. Pt to d/c all alcohol and go to rehab.    Call placed to the patient at [phone]. No Answer. Left VM message from Dr Lara. Our telephone number is in your phone. Please reach out to us to help.

## 2022-08-17 NOTE — TELEPHONE ENCOUNTER
----- Message from Carrie Lara MD sent at 8/15/2022  7:13 PM CDT -----  Please call pt and let her know peth suggests heavy alcohol. Pt to d/c all alcohol and go to rehab

## 2022-08-17 NOTE — TELEPHONE ENCOUNTER
Left a voice message that I would try again later        Carrie Lara MD sent to [facility]AM[facility] Staff  She is immune to hepatitis A and B. Please let pt know

## 2022-08-18 ENCOUNTER — HOSPITAL ENCOUNTER (OUTPATIENT)
Facility: HOSPITAL | Age: 72
Discharge: HOME | End: 2022-08-18
Attending: INTERNAL MEDICINE | Admitting: INTERNAL MEDICINE
Payer: MEDICARE

## 2022-08-18 ENCOUNTER — ANESTHESIA EVENT (OUTPATIENT)
Dept: ENDOSCOPY | Facility: HOSPITAL | Age: 72
End: 2022-08-18
Payer: COMMERCIAL

## 2022-08-18 ENCOUNTER — ANESTHESIA (OUTPATIENT)
Dept: ENDOSCOPY | Facility: HOSPITAL | Age: 72
End: 2022-08-18
Payer: COMMERCIAL

## 2022-08-18 VITALS
RESPIRATION RATE: 18 BRPM | WEIGHT: 149 LBS | OXYGEN SATURATION: 100 % | HEIGHT: 62 IN | TEMPERATURE: 98 F | SYSTOLIC BLOOD PRESSURE: 129 MMHG | HEART RATE: 66 BPM | BODY MASS INDEX: 27.42 KG/M2 | DIASTOLIC BLOOD PRESSURE: 72 MMHG

## 2022-08-18 DIAGNOSIS — K74.60 CIRRHOSIS: ICD-10-CM

## 2022-08-18 DIAGNOSIS — I85.10 SECONDARY ESOPHAGEAL VARICES WITHOUT BLEEDING: ICD-10-CM

## 2022-08-18 DIAGNOSIS — K70.31 ALCOHOLIC CIRRHOSIS OF LIVER WITH ASCITES: Primary | ICD-10-CM

## 2022-08-18 PROCEDURE — 88305 TISSUE EXAM BY PATHOLOGIST: CPT | Mod: 26,,, | Performed by: PATHOLOGY

## 2022-08-18 PROCEDURE — 43239 EGD BIOPSY SINGLE/MULTIPLE: CPT | Performed by: INTERNAL MEDICINE

## 2022-08-18 PROCEDURE — 63600175 PHARM REV CODE 636 W HCPCS: Performed by: NURSE ANESTHETIST, CERTIFIED REGISTERED

## 2022-08-18 PROCEDURE — 25000003 PHARM REV CODE 250: Performed by: NURSE ANESTHETIST, CERTIFIED REGISTERED

## 2022-08-18 PROCEDURE — 88305 TISSUE EXAM BY PATHOLOGIST: CPT | Performed by: PATHOLOGY

## 2022-08-18 PROCEDURE — D9220A PRA ANESTHESIA: Mod: ANES,,, | Performed by: ANESTHESIOLOGY

## 2022-08-18 PROCEDURE — 37000008 HC ANESTHESIA 1ST 15 MINUTES: Performed by: INTERNAL MEDICINE

## 2022-08-18 PROCEDURE — D9220A PRA ANESTHESIA: ICD-10-PCS | Mod: ANES,,, | Performed by: ANESTHESIOLOGY

## 2022-08-18 PROCEDURE — D9220A PRA ANESTHESIA: ICD-10-PCS | Mod: CRNA,,, | Performed by: NURSE ANESTHETIST, CERTIFIED REGISTERED

## 2022-08-18 PROCEDURE — 37000009 HC ANESTHESIA EA ADD 15 MINS: Performed by: INTERNAL MEDICINE

## 2022-08-18 PROCEDURE — 43239 PR EGD, FLEX, W/BIOPSY, SGL/MULTI: ICD-10-PCS | Mod: ,,, | Performed by: INTERNAL MEDICINE

## 2022-08-18 PROCEDURE — 43239 EGD BIOPSY SINGLE/MULTIPLE: CPT | Mod: ,,, | Performed by: INTERNAL MEDICINE

## 2022-08-18 PROCEDURE — 88305 TISSUE EXAM BY PATHOLOGIST: ICD-10-PCS | Mod: 26,,, | Performed by: PATHOLOGY

## 2022-08-18 PROCEDURE — D9220A PRA ANESTHESIA: Mod: CRNA,,, | Performed by: NURSE ANESTHETIST, CERTIFIED REGISTERED

## 2022-08-18 PROCEDURE — 27201012 HC FORCEPS, HOT/COLD, DISP: Performed by: INTERNAL MEDICINE

## 2022-08-18 RX ORDER — LIDOCAINE HYDROCHLORIDE 20 MG/ML
INJECTION INTRAVENOUS
Status: DISCONTINUED | OUTPATIENT
Start: 2022-08-18 | End: 2022-08-18

## 2022-08-18 RX ORDER — SODIUM CHLORIDE 0.9 % (FLUSH) 0.9 %
10 SYRINGE (ML) INJECTION
Status: DISCONTINUED | OUTPATIENT
Start: 2022-08-18 | End: 2022-08-18 | Stop reason: HOSPADM

## 2022-08-18 RX ORDER — PROPOFOL 10 MG/ML
VIAL (ML) INTRAVENOUS
Status: DISCONTINUED | OUTPATIENT
Start: 2022-08-18 | End: 2022-08-18

## 2022-08-18 RX ORDER — SODIUM CHLORIDE 9 MG/ML
INJECTION, SOLUTION INTRAVENOUS CONTINUOUS
Status: DISCONTINUED | OUTPATIENT
Start: 2022-08-18 | End: 2022-08-18 | Stop reason: HOSPADM

## 2022-08-18 RX ADMIN — PROPOFOL 20 MG: 10 INJECTION, EMULSION INTRAVENOUS at 10:08

## 2022-08-18 RX ADMIN — PROPOFOL 50 MG: 10 INJECTION, EMULSION INTRAVENOUS at 10:08

## 2022-08-18 RX ADMIN — PROPOFOL 40 MG: 10 INJECTION, EMULSION INTRAVENOUS at 10:08

## 2022-08-18 RX ADMIN — LIDOCAINE HYDROCHLORIDE 20 MG: 20 INJECTION, SOLUTION INTRAVENOUS at 10:08

## 2022-08-18 RX ADMIN — SODIUM CHLORIDE: 0.9 INJECTION, SOLUTION INTRAVENOUS at 09:08

## 2022-08-18 NOTE — PROVATION PATIENT INSTRUCTIONS
Discharge Summary/Instructions after an Endoscopic Procedure  Patient Name: Margret Dykes  Patient MRN: [id number]  Patient YOB: 1950 Thursday, August 18, 2022  Jessica Aguilera MD  Dear patient,  As a result of recent federal legislation (The Federal Cures Act), you may   receive lab or pathology results from your procedure in your [facility]   account before your physician is able to contact you. Your physician or   their representative will relay the results to you with their   recommendations at their soonest availability.  Thank you,  RESTRICTIONS:  During your procedure today, you received medications for sedation.  These   medications may affect your judgment, balance and coordination.  Therefore,   for 24 hours, you have the following restrictions:   - DO NOT drive a car, operate machinery, make legal/financial decisions,   sign important papers or drink alcohol.    ACTIVITY:  Today: no heavy lifting, straining or running due to procedural   sedation/anesthesia.  The following day: return to full activity including work.  DIET:  Eat and drink normally unless instructed otherwise.     TREATMENT FOR COMMON SIDE EFFECTS:  - Mild abdominal pain, nausea, belching, bloating or excessive gas:  rest,   eat lightly and use a heating pad.  - Sore Throat: treat with throat lozenges and/or gargle with warm salt   water.  - Because air was used during the procedure, expelling large amounts of air   from your rectum or belching is normal.  - If a bowel prep was taken, you may not have a bowel movement for 1-3 days.    This is normal.  SYMPTOMS TO WATCH FOR AND REPORT TO YOUR PHYSICIAN:  1. Abdominal pain or bloating, other than gas cramps.  2. Chest pain.  3. Back pain.  4. Signs of infection such as: chills or fever occurring within 24 hours   after the procedure.  5. Rectal bleeding, which would show as bright red, maroon, or black stools.   (A tablespoon of blood from the rectum is not serious, especially  if   hemorrhoids are present.)  6. Vomiting.  7. Weakness or dizziness.  GO DIRECTLY TO THE NEAREST EMERGENCY ROOM IF YOU HAVE ANY OF THE FOLLOWING:      Difficulty breathing              Chills and/or fever over 101 F   Persistent vomiting and/or vomiting blood   Severe abdominal pain   Severe chest pain   Black, tarry stools   Bleeding- more than one tablespoon   Any other symptom or condition that you feel may need urgent attention  Your doctor recommends these additional instructions:  If any biopsies were taken, your doctors clinic will contact you in 1 to 2   weeks with any results.  - Await pathology results.   - Discharge patient to home (with escort).   - Resume previous diet.   - Continue present medications.   - The findings and recommendations were discussed with the patient.   - Patient has a contact number available for emergencies.  The signs and   symptoms of potential delayed complications were discussed with the   patient.  Return to normal activities tomorrow.  Written discharge   instructions were provided to the patient.   - Repeat upper endoscopy in 1 year for surveillance.  For questions, problems or results please call your physician - Jessica Aguilera MD at Work:  [phone].  [facility], EMERGENCY ROOM PHONE NUMBER: [phone]  IF A COMPLICATION OR EMERGENCY SITUATION ARISES AND YOU ARE UNABLE TO REACH   YOUR PHYSICIAN - GO DIRECTLY TO THE EMERGENCY ROOM.  Jessica Aguilera MD  8/18/2022 10:29:16 AM  This report has been verified and signed electronically.  Dear patient,  As a result of recent federal legislation (The Federal Cures Act), you may   receive lab or pathology results from your procedure in your [facility]   account before your physician is able to contact you. Your physician or   their representative will relay the results to you with their   recommendations at their soonest availability.  Thank you,  PROVATION

## 2022-08-18 NOTE — TRANSFER OF CARE
"Anesthesia Transfer of Care Note    Patient: Margret Dykes    Procedure(s) Performed: Procedure(s) (LRB):  EGD (ESOPHAGOGASTRODUODENOSCOPY) (Left)    Patient location: PACU    Anesthesia Type: general    Transport from OR: Transported from OR on room air with adequate spontaneous ventilation    Post pain: adequate analgesia    Post assessment: no apparent anesthetic complications and tolerated procedure well    Post vital signs: stable    Level of consciousness: awake, alert and oriented    Nausea/Vomiting: no nausea/vomiting    Complications: none    Transfer of care protocol was followed      Last vitals:   Visit Vitals  BP 97/53   Pulse 86   Temp 97.7   Resp 20   Ht 5' 2" (1.575 m)   Wt 67.6 kg (149 lb)   SpO2 96%   Breastfeeding No   BMI 27.25 kg/m²     "

## 2022-08-18 NOTE — TELEPHONE ENCOUNTER
Spoke with patient states she received a call from Dr. Lara office today.  Patient states a message was left that her blood test had very high alcohol levels.  Patient states message told her to go to Rehab immediately.  Patient states she has liver damage.  Patient wants to discuss lab results.  Spoke with on call provider Dr. Thuan Jenkins whom states this can wait until tomorrow and not emergent at this time.  Patient aware that message will be sent to office for follow up in the morning.  Patient has a scheduled EGD around 9:30 am.  Advised patient to call back if no response from office on tomorrow.  Patient verbalized understanding.      Reason for Disposition   [1] Follow-up call from patient regarding patient's clinical status AND [2] information urgent    Protocols used: PCP CALL - NO TRIAGE-A-

## 2022-08-18 NOTE — ANESTHESIA POSTPROCEDURE EVALUATION
Anesthesia Post Evaluation    Patient: Margret Dykes    Procedure(s) Performed: Procedure(s) (LRB):  EGD (ESOPHAGOGASTRODUODENOSCOPY) (Left)    Final Anesthesia Type: general      Patient location during evaluation: PACU  Patient participation: Yes- Able to Participate  Level of consciousness: awake and alert  Post-procedure vital signs: reviewed and stable  Pain management: adequate  Airway patency: patent    PONV status at discharge: No PONV  Anesthetic complications: no      Cardiovascular status: blood pressure returned to baseline  Respiratory status: unassisted  Hydration status: euvolemic  Follow-up not needed.          Vitals Value Taken Time   /69 08/18/22 1117   Temp 36.6 °C (97.9 °F) 08/18/22 1025   Pulse 67 08/18/22 1117   Resp 36 08/18/22 1117   SpO2 100 % 08/18/22 1117   Vitals shown include unvalidated device data.      No case tracking events are documented in the log.      Pain/Ivy Score: Ivy Score: 10 (8/18/2022 10:45 AM)

## 2022-08-18 NOTE — H&P
Short Stay Endoscopy History and Physical    PCP - Primary Doctor No     Procedure - EGD  ASA - per anesthesia  Mallampati - per anesthesia  History of Anesthesia problems - no  Family history Anesthesia problems -  no   Plan of anesthesia - General    HPI:  This is a 72 y.o. female here for evaluation of r/o varices, ho ? Variceal bleed,  HCV/ETOH cirrhosis        Medical History:  has a past medical history of Secondary esophageal varices without bleeding (8/11/2022).    Surgical History:  has no past surgical history on file.    Family History: family history is not on file.. Otherwise no colon cancer, inflammatory bowel disease, or GI malignancies.    Social History:  reports that she has never smoked. She has never used smokeless tobacco.    Review of patient's allergies indicates:   Allergen Reactions    Celebrex [celecoxib] Anxiety     Feels like things crawling in skin     Acetaminophen      Pt with liver damage    Aspirin     Compazine [prochlorperazine] Nausea And Vomiting       Medications:   Medications Prior to Admission   Medication Sig Dispense Refill Last Dose    BLOOD PRESSURE CUFF Misc 1 Pump by Misc.(Non-Drug; Combo Route) route 2 (two) times a day. Measure BP twice daily 1 each 0     dextroamphetamine-amphetamine (ADDERALL) 15 mg tablet Take 15 mg by mouth 2 (two) times daily. Patient is out , would like a refill       ENULOSE 10 gram/15 mL solution Take 30 mLs by mouth 2 (two) times daily.       EScitalopram oxalate (LEXAPRO) 20 MG tablet Take 1 tablet (20 mg total) by mouth every morning. 30 tablet 0     famotidine (PEPCID) 20 MG tablet Take 20 mg by mouth every morning.       ferrous sulfate 325 (65 FE) MG EC tablet Take by mouth once daily.       hydrocortisone (ANUSOL-HC) 2.5 % rectal cream SMARTSIG:Rectally 4 Times Daily       multivitamin (THERAGRAN) tablet Take 1 tablet by mouth once daily.       ondansetron (ZOFRAN) 4 MG tablet Take 4 mg by mouth every 8 (eight) hours as  needed.       pantoprazole (PROTONIX) 40 MG tablet Take 40 mg by mouth 2 (two) times daily.       propranoloL (INDERAL) 20 MG tablet Take 20 mg by mouth 3 (three) times daily.       rifAXIMin (XIFAXAN) 550 mg Tab Take 1 tablet (550 mg total) by mouth 2 (two) times daily. 60 tablet 11     sucralfate (CARAFATE) 1 gram tablet Take 1 g by mouth.       sucralfate (CARAFATE) 1 gram tablet Take 1 tablet (1 g total) by mouth 4 (four) times daily before meals and nightly. 120 tablet 2     thiamine 100 MG tablet Take 100 mg by mouth once daily.          Physical Exam:    Vital Signs: There were no vitals filed for this visit.    I have explained the risks and benefits of endoscopy procedures to the patient including but not limited to bleeding, perforation, infection, and death.      Jessica Aguilera MD

## 2022-08-18 NOTE — ANESTHESIA PREPROCEDURE EVALUATION
08/18/2022  Pre-operative evaluation for Procedure(s) (LRB):  EGD (ESOPHAGOGASTRODUODENOSCOPY) (Left)    Margret Dykes is a 72 y.o. 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       Patient Active Problem List   Diagnosis    Cirrhosis    ETOH abuse    Gastrointestinal hemorrhage    Hepatic encephalopathy    Hepatitis C    Peptic ulcer due to Helicobacter pylori    Thrombocytopenia    Altered mental status    GERD (gastroesophageal reflux disease)    Hiatal hernia    Secondary esophageal varices without bleeding       Review of patient's allergies indicates:   Allergen Reactions    Celebrex [celecoxib] Anxiety     Feels like things crawling in skin     Acetaminophen      Pt with liver damage    Aspirin     Compazine [prochlorperazine] Nausea And Vomiting       No current facility-administered medications on file prior to encounter.     Current Outpatient Medications on File Prior to Encounter   Medication Sig Dispense Refill    dextroamphetamine-amphetamine (ADDERALL) 15 mg tablet Take 15 mg by mouth 2 (two) times daily. Patient is out , would like a refill      EScitalopram oxalate (LEXAPRO) 20 MG tablet Take 1 tablet (20 mg total) by mouth every morning. 30 tablet 0    famotidine (PEPCID) 20 MG tablet Take 20 mg by mouth every morning.      ferrous sulfate 325 (65 FE) MG EC tablet Take by mouth once daily.      multivitamin (THERAGRAN) tablet Take 1 tablet by mouth once daily.      ondansetron (ZOFRAN) 4 MG tablet Take 4 mg by mouth every 8 (eight) hours as needed.      pantoprazole (PROTONIX) 40 MG tablet Take 40 mg by mouth 2 (two) times daily.      rifAXIMin (XIFAXAN) 550 mg Tab Take 1 tablet (550 mg total) by mouth 2 (two) times daily. 60 tablet 11    sucralfate  (CARAFATE) 1 gram tablet Take 1 g by mouth.      BLOOD PRESSURE CUFF Misc 1 Pump by Misc.(Non-Drug; Combo Route) route 2 (two) times a day. Measure BP twice daily 1 each 0    ENULOSE 10 gram/15 mL solution Take 30 mLs by mouth 2 (two) times daily.      hydrocortisone (ANUSOL-HC) 2.5 % rectal cream SMARTSIG:Rectally 4 Times Daily      sucralfate (CARAFATE) 1 gram tablet Take 1 tablet (1 g total) by mouth 4 (four) times daily before meals and nightly. 120 tablet 2    thiamine 100 MG tablet Take 100 mg by mouth once daily.      [DISCONTINUED] zolpidem (AMBIEN) 5 MG Tab Take 5 mg by mouth nightly as needed.         History reviewed. No pertinent surgical history.    Social History     Socioeconomic History    Marital status: Single   Tobacco Use    Smoking status: Never Smoker    Smokeless tobacco: Never Used     Social Determinants of Health     Financial Resource Strain: Low Risk     Difficulty of Paying Living Expenses: Not hard at all   Food Insecurity: No Food Insecurity    Worried About Running Out of Food in the Last Year: Never true    Ran Out of Food in the Last Year: Never true   Transportation Needs: No Transportation Needs    Lack of Transportation (Medical): No    Lack of Transportation (Non-Medical): No   Physical Activity: Inactive    Days of Exercise per Week: 0 days    Minutes of Exercise per Session: 0 min   Stress: No Stress Concern Present    Feeling of Stress : Not at all   Social Connections: Socially Isolated    Frequency of Communication with Friends and Family: More than three times a week    Frequency of Social Gatherings with Friends and Family: More than three times a week    Attends Adventist Services: Never    Active Member of Clubs or Organizations: No    Attends Club or Organization Meetings: Never    Marital Status: Never    Housing Stability: Unknown    Unable to Pay for Housing in the Last Year: No    Unstable Housing in the Last Year: No         CBC: No  results for input(s): WBC, RBC, HGB, HCT, PLT, MCV, MCH, MCHC in the last 72 hours.    CMP: No results for input(s): NA, K, CL, CO2, BUN, CREATININE, GLU, MG, PHOS, CALCIUM, ALBUMIN, PROT, ALKPHOS, ALT, AST, BILITOT in the last 72 hours.    INR  No results for input(s): PT, INR, PROTIME, APTT in the last 72 hours.        Diagnostic Studies:      EKD Echo:  22  · The left ventricle is normal in size with normal systolic function.  · The estimated ejection fraction is 70%.  · Normal left ventricular diastolic function.  · Normal right ventricular size with normal right ventricular systolic function.  · Mild aortic valve sclerosis.  · The estimated PA systolic pressure is 25 mmHg.  · Normal central venous pressure (3 mmHg).        Pre-op Assessment    I have reviewed the Patient Summary Reports.     I have reviewed the Nursing Notes. I have reviewed the NPO Status.      Review of Systems  Hematology/Oncology:         -- Anemia:   EENT/Dental:EENT/Dental Normal   Cardiovascular:  Cardiovascular Normal     Pulmonary:  Pulmonary Normal    Hepatic/GI:   PUD, Hiatal Hernia, GERD Liver Disease, Hepatitis MELD-Na score: 8 at 2022 11:59 AM Liver Disease, Hepatitis, Viral Hepatitis Type C , Cirrhosis Esophageal Varices    Musculoskeletal:  Musculoskeletal Normal    Neurological:  Neurology Normal    Endocrine:  Endocrine Normal    Psych:   Psychiatric History (hx of ETOH abuse) [place] [facility] [place] -? Secondary to polypharmacy- ambien, trazodone? tox screen positive for opioids, ampehtamines (adderrall), THC         Physical Exam  General: Well nourished, Alert and Cooperative    Airway:  Mallampati: II           Anesthesia Plan  Type of Anesthesia, risks & benefits discussed:    Anesthesia Type: Gen Natural Airway, Gen ETT  Informed Consent: Informed consent signed with the Patient and all parties understand the risks and agree with anesthesia plan.  All questions answered.   ASA Score: 3  Day  of Surgery Review of History & Physical: H&P Update referred to the surgeon/provider.    Ready For Surgery From Anesthesia Perspective.     .

## 2022-08-19 ENCOUNTER — TELEPHONE (OUTPATIENT)
Dept: HEPATOLOGY | Facility: CLINIC | Age: 72
End: 2022-08-19
Payer: MEDICARE

## 2022-08-19 NOTE — TELEPHONE ENCOUNTER
---- Message from Dr Carrie Lara ------  Please call pt and let her know peth suggest heavy alcohol. Pt to d/c all alcohol and go to rehab.  Call placed to the patient with the Highlighted number at [phone]. No answer. Left vm message from Dr Lara. Our telephone number is in your phone. Please reach out to us to help.    The telephone number was for the daughter Judson.  Judson returned the call to the hospital nurse on call.    Call was returned to Judson on yesterday [phone]. She could not talk and asked me to call back in 5 minutes. Clinic was busy and unable to return call.    Call returned this Am. I asked if this was Margret and Judson stated Yes. After I had explained the message she said she was the daughter Judson.  She stated I better understand the message now.    You are the highlighted number to call. That is my number. My mothers number is [phone].  I can call your mother and explain the message from Dr Lara. Judson said I will tell her.    We have looked into rehab places but no one will take anyone over 65. I will let Dr Lara know.    Did Dr Lara tell you anything about the EGD results? No, she has not sent a message.

## 2022-08-19 NOTE — TELEPHONE ENCOUNTER
----- Message from Dr Carrie Lara ------  Only small varices- did not need to be banded .    Call returned to Judson, daughter of the patient. She was asking about results of the EGD that was done on her mother.  Message relayed from Dr Lara. Judson said that is good news. Voiced understanding.

## 2022-08-20 ENCOUNTER — HOSPITAL ENCOUNTER (OUTPATIENT)
Dept: RADIOLOGY | Facility: HOSPITAL | Age: 72
Discharge: HOME | End: 2022-08-20
Attending: INTERNAL MEDICINE
Payer: MEDICARE

## 2022-08-20 DIAGNOSIS — K74.60 HEPATIC CIRRHOSIS, UNSPECIFIED HEPATIC CIRRHOSIS TYPE, UNSPECIFIED WHETHER ASCITES PRESENT: ICD-10-CM

## 2022-08-20 PROCEDURE — 76700 US EXAM ABDOM COMPLETE: CPT | Mod: 26,,, | Performed by: RADIOLOGY

## 2022-08-20 PROCEDURE — 76700 US ABDOMEN COMPLETE: ICD-10-PCS | Mod: 26,,, | Performed by: RADIOLOGY

## 2022-08-20 PROCEDURE — 76700 US EXAM ABDOM COMPLETE: CPT | Mod: TC

## 2022-08-23 ENCOUNTER — TELEPHONE (OUTPATIENT)
Dept: HEPATOLOGY | Facility: CLINIC | Age: 72
End: 2022-08-23
Payer: MEDICARE

## 2022-08-23 NOTE — TELEPHONE ENCOUNTER
From: Carrie Lara MD   Sent: 8/22/2022   4:42 PM CDT   To: [facility] Staff     No liver cancer; no ascites on abdo US- let pt know   Call to patient to please call me back at [phone]

## 2022-08-24 LAB
FINAL PATHOLOGIC DIAGNOSIS: NORMAL
Lab: NORMAL

## 2022-08-25 ENCOUNTER — TELEPHONE (OUTPATIENT)
Dept: GASTROENTEROLOGY | Facility: CLINIC | Age: 72
End: 2022-08-25
Payer: MEDICARE

## 2022-08-25 NOTE — TELEPHONE ENCOUNTER
----- Message from Jessica Aguilera MD sent at 8/25/2022  2:40 PM CDT -----  Please let the pt know that pathology from recent procedure shows:    No  significant abnormality    Endoscopy and colonoscopy is not a perfect exam of the colon. Rarely a significant polyp or colon cancer can be missed. He/she should not ignore symptoms such as blood with bowel movements or abdominal pain and report these symptoms to the doctor if they occur.

## 2022-08-25 NOTE — TELEPHONE ENCOUNTER
Called pt, spoke with daughter. Explained per dr Aguilera:    Please let the pt know that pathology from recent procedure shows:     No  significant abnormality     Endoscopy and colonoscopy is not a perfect exam of the colon. Rarely a significant polyp or colon cancer can be missed. He/she should not ignore symptoms such as blood with bowel movements or abdominal pain and report these symptoms to the doctor if they occur.

## 2022-08-30 ENCOUNTER — TELEPHONE (OUTPATIENT)
Dept: GASTROENTEROLOGY | Facility: CLINIC | Age: 72
End: 2022-08-30
Payer: MEDICARE

## 2022-08-30 NOTE — TELEPHONE ENCOUNTER
----- Message from Kita Lynch MA sent at 8/29/2022  6:54 PM CDT -----  Regarding: FW: Results  Contact: [phone]    ----- Message -----  From: Diann Palacios  Sent: 8/29/2022   4:26 PM CDT  To: [facility] Staff  Subject: Results                                          Calling in regards to speaking with provider regarding results. Please call

## 2022-09-02 ENCOUNTER — TELEPHONE (OUTPATIENT)
Dept: SURGERY | Facility: CLINIC | Age: 72
End: 2022-09-02
Payer: MEDICARE

## 2022-09-02 NOTE — TELEPHONE ENCOUNTER
Pt informed that Dr. Mcadams will call her with updated information after our upper gi conference.  Pt is very concerned and worried and would like a phone call asap.

## 2022-09-16 ENCOUNTER — DOCUMENTATION ONLY (OUTPATIENT)
Dept: SURGERY | Facility: CLINIC | Age: 72
End: 2022-09-16
Payer: MEDICARE

## 2022-09-16 NOTE — PATIENT CARE CONFERENCE
[facility]   BENIGN FOREGUT MULTIDISCIPLINARY CONFERENCE  PATIENT REVIEW FORM  ____________________________________________________________    CLINIC #: [id number]    DATE: 09/16/2022    DIAGNOSIS:     [] Achalsia       [] Gastropariesis           [] Functional Dyspepsia   [] Chronic Diarrhea      [] Dysphagia   [] Pseudoachalasia       [] GERD   [x] Hiatal Hernia       [] Dysmotility   [] Dumping Syndrome  [] Cyclic Vomiting   [] IBS       [] Outlet Obstruction    [] Fecal Incontinence    [] Hirschsprung's Disease        PRESENTER:      [] Ale    [x] Pema   [] Roberto        [] Cabrera  [] Fuad         PATIENT SUMMARY:   Patient consults in company of her daughter, personal history of hep c, esophageal varices and chyrrosis. referred to surgery by hepatologist. Consults for hiatal hernia asking for second opinion since, according to daughter, was told by hepatology she wasn't a candidate for surgery. she's getting an endoscopy done on Thursday ordered by [facility] for previous diagnosis.      Pain  This is a chronic problem. The current episode started more than 1 year ago. The problem occurs 2 to 4 times per day. The problem has been unchanged. Associated symptoms include abdominal pain, chest pain, coughing and fatigue. Pertinent negatives include no change in bowel habit, fever or vomiting. The symptoms are aggravated by stress, exertion and eating. Treatments tried: antiacids  The treatment provided mild relief.     RECOMMENDATIONS:   Discuss with Hepatology.  We can order a GES to evaluate for gastroparesis.  Consider TIPS but the concern from the group bases on her CT is that the liver may be too large and firm to safely proceed.      CONSULT NEEDED:         [] Surgery    [] ENT    [] GI    [] Speech Pathology                IMAGING:       [] Esophagram     [] MBS    [] CT ABD/PELVIS       [x] GES   [] MRI    [] UGI w/SBFT   [] SITZ MARKER      [] EKG    [] U/S    [] DEFOGRAM    PROCEDURES:     [] EGD  [] Impedance  [] MANOMETRY  [] SURGICAL INTERVENTION      [] COLONOSCOPY    [] ERCP    [] EUS    [] ENTEROSCOPY

## 2022-09-20 ENCOUNTER — TELEPHONE (OUTPATIENT)
Dept: SURGERY | Facility: CLINIC | Age: 72
End: 2022-09-20
Payer: MEDICARE

## 2022-09-20 DIAGNOSIS — K44.9 HIATAL HERNIA: Primary | ICD-10-CM

## 2022-09-20 NOTE — TELEPHONE ENCOUNTER
Called pt per Dr. Mcadams's request to inform pt that as of right now we are holding off on surgery d/t heptology recs, but he would like to go ahead and get a GES.  Pt understands plan and would like to schedule the test at [facility] in [place], [place]. Will fax orders and call hospital to schedule.

## 2023-09-05 DIAGNOSIS — K76.82 HEPATIC ENCEPHALOPATHY: ICD-10-CM

## 2023-09-05 RX ORDER — RIFAXIMIN 550 MG/1
TABLET ORAL
Qty: 60 TABLET | Refills: 11 | Status: SHIPPED | OUTPATIENT
Start: 2023-09-05

## 2024-04-24 DIAGNOSIS — R26.89 OTHER ABNORMALITIES OF GAIT AND MOBILITY: Primary | ICD-10-CM

## 2024-11-12 ENCOUNTER — CLAIMS CREATED FROM THE CLAIM WINDOW (OUTPATIENT)
Dept: URBAN - METROPOLITAN AREA MEDICAL CENTER 39 | Facility: MEDICAL CENTER | Age: 74
End: 2024-11-12
Payer: MEDICARE

## 2024-11-12 DIAGNOSIS — R13.19 DYSPHAGIA: ICD-10-CM

## 2024-11-12 DIAGNOSIS — R93.3 ABN FINDINGS-GI TRACT: ICD-10-CM

## 2024-11-12 DIAGNOSIS — K74.60 CIRRHOSIS: ICD-10-CM

## 2024-11-12 PROCEDURE — 99255 IP/OBS CONSLTJ NEW/EST HI 80: CPT | Performed by: INTERNAL MEDICINE

## 2024-11-12 PROCEDURE — 99205 OFFICE O/P NEW HI 60 MIN: CPT | Performed by: INTERNAL MEDICINE

## 2024-11-13 ENCOUNTER — CLAIMS CREATED FROM THE CLAIM WINDOW (OUTPATIENT)
Dept: URBAN - METROPOLITAN AREA MEDICAL CENTER 39 | Facility: MEDICAL CENTER | Age: 74
End: 2024-11-13
Payer: MEDICARE

## 2024-11-13 DIAGNOSIS — K22.10 ULCER OF ESOPHAGUS WITHOUT BLEEDING: ICD-10-CM

## 2024-11-13 DIAGNOSIS — R93.3 ABN FINDINGS-GI TRACT: ICD-10-CM

## 2024-11-13 DIAGNOSIS — K51.40 PSEUDOPOLYP OF ASCENDING COLON WITHOUT COMPLICATION: ICD-10-CM

## 2024-11-13 DIAGNOSIS — K74.60 CIRRHOSIS: ICD-10-CM

## 2024-11-13 DIAGNOSIS — K29.50 CHRONIC GASTRITIS: ICD-10-CM

## 2024-11-13 DIAGNOSIS — R10.13 ABDOMINAL PAIN, EPIGASTRIC: ICD-10-CM

## 2024-11-13 DIAGNOSIS — K63.89 OTHER SPECIFIED DISEASES OF INTESTINE: ICD-10-CM

## 2024-11-13 PROCEDURE — 43235 EGD DIAGNOSTIC BRUSH WASH: CPT | Performed by: INTERNAL MEDICINE

## 2024-11-13 PROCEDURE — 43239 EGD BIOPSY SINGLE/MULTIPLE: CPT | Performed by: INTERNAL MEDICINE

## 2024-11-13 PROCEDURE — 45380 COLONOSCOPY AND BIOPSY: CPT | Performed by: INTERNAL MEDICINE

## 2024-11-14 ENCOUNTER — CLAIMS CREATED FROM THE CLAIM WINDOW (OUTPATIENT)
Dept: URBAN - METROPOLITAN AREA MEDICAL CENTER 39 | Facility: MEDICAL CENTER | Age: 74
End: 2024-11-14
Payer: MEDICARE

## 2024-11-14 DIAGNOSIS — K74.60 CIRRHOSIS: ICD-10-CM

## 2024-11-14 DIAGNOSIS — R93.3 ABN FINDINGS-GI TRACT: ICD-10-CM

## 2024-11-14 DIAGNOSIS — R13.19 DYSPHAGIA: ICD-10-CM

## 2024-11-14 PROCEDURE — 99232 SBSQ HOSP IP/OBS MODERATE 35: CPT | Performed by: INTERNAL MEDICINE

## 2024-11-27 ENCOUNTER — OFFICE VISIT (OUTPATIENT)
Dept: URBAN - METROPOLITAN AREA CLINIC 98 | Facility: CLINIC | Age: 74
End: 2024-11-27
Payer: MEDICARE

## 2024-11-27 ENCOUNTER — DASHBOARD ENCOUNTERS (OUTPATIENT)
Age: 74
End: 2024-11-27

## 2024-11-27 VITALS
SYSTOLIC BLOOD PRESSURE: 117 MMHG | HEIGHT: 62 IN | BODY MASS INDEX: 26.87 KG/M2 | TEMPERATURE: 97 F | DIASTOLIC BLOOD PRESSURE: 71 MMHG | WEIGHT: 146 LBS | HEART RATE: 112 BPM

## 2024-11-27 DIAGNOSIS — K76.82 HEPATIC ENCEPHALOPATHY: ICD-10-CM

## 2024-11-27 DIAGNOSIS — Z86.19 HISTORY OF HEPATITIS C: ICD-10-CM

## 2024-11-27 DIAGNOSIS — K74.60 UNSPECIFIED CIRRHOSIS OF LIVER: ICD-10-CM

## 2024-11-27 DIAGNOSIS — K22.10 ULCER OF ESOPHAGUS WITHOUT BLEEDING: ICD-10-CM

## 2024-11-27 DIAGNOSIS — R18.8 OTHER ASCITES: ICD-10-CM

## 2024-11-27 DIAGNOSIS — K52.9 COLITIS: ICD-10-CM

## 2024-11-27 PROBLEM — 389026000: Status: ACTIVE | Noted: 2024-11-27

## 2024-11-27 PROBLEM — 30811009: Status: ACTIVE | Noted: 2024-11-27

## 2024-11-27 PROBLEM — 13920009: Status: ACTIVE | Noted: 2024-11-27

## 2024-11-27 PROBLEM — 19943007: Status: ACTIVE | Noted: 2024-11-27

## 2024-11-27 PROCEDURE — 99214 OFFICE O/P EST MOD 30 MIN: CPT

## 2024-11-27 RX ORDER — BUMETANIDE 1 MG/1
TABLET ORAL
Qty: 15 TABLET | Status: ACTIVE | COMMUNITY

## 2024-11-27 RX ORDER — DEXTROAMPHETAMINE SACCHARATE, AMPHETAMINE ASPARTATE, DEXTROAMPHETAMINE SULFATE, AMPHETAMINE SULFATE TABLETS, 10 MG,CLL 2.5; 2.5; 2.5; 2.5 MG/1; MG/1; MG/1; MG/1
TABLET ORAL
Qty: 90 TABLET | Status: ACTIVE | COMMUNITY

## 2024-11-27 RX ORDER — SUCRALFATE ORAL 1 G/10ML
SUSPENSION ORAL
Qty: 840 MILLILITER | Status: ACTIVE | COMMUNITY

## 2024-11-27 RX ORDER — ATORVASTATIN CALCIUM 10 MG/1
TABLET ORAL
Qty: 7 TABLET | Status: ACTIVE | COMMUNITY

## 2024-11-27 RX ORDER — ESCITALOPRAM OXALATE 20 MG/1
TAKE 1 TABLET BY MOUTH EVERY DAY IN THE MORNING FOR DEPRESSION TABLET ORAL
Qty: 90 EACH | Refills: 0 | Status: ACTIVE | COMMUNITY

## 2024-11-27 RX ORDER — RIFAXIMIN 550 MG/1
TABLET ORAL
Qty: 60 TABLET | Status: ACTIVE | COMMUNITY

## 2024-11-27 RX ORDER — LACTULOSE 10 G/15ML
SOLUTION ORAL
Qty: 450 MILLILITER | Status: ACTIVE | COMMUNITY

## 2024-11-27 RX ORDER — BUPROPION HYDROCHLORIDE 150 MG/1
TABLET, FILM COATED, EXTENDED RELEASE ORAL
Qty: 30 TABLET | Status: ACTIVE | COMMUNITY

## 2024-11-27 RX ORDER — PANTOPRAZOLE 40 MG/1
TABLET, DELAYED RELEASE ORAL
Qty: 26 TABLET | Status: ACTIVE | COMMUNITY

## 2024-11-27 NOTE — HPI-TODAY'S VISIT:
11/27/2024 Desi Olson NP -74-year-old female here to follow-up after recent ER visit decompensated cirrhosis, dysphagia, and colitis - No PCP -ESTJ 11/11-11/14/24-  Patient presented to ER with N/V and abdominal pain. Ct scan with colitis. Infectious work-up negative. PMH: History of heavy alcohol use and hepatitis C, treated. Moved here from [place] patient reports diagnosed with cirrhosis while in [place]. Etiology unclear, although records indicate history of prior alcohol use and HCV that has been reportedly treated. Patient discharged from hospital on PPI daily, Xifaxan bid, sucralfate every 6 hours, lactulose bid, spironolactone 50 daily, thiamine 100 mg daily, Bumex 0.5 mg daily -MRI 11/14/2024- no fat or iron in the liver.  Cirrhotic morphology of the liver.  No cysts suspicious hepatic lesions.  Gallstones without acute cholecystitis.  Splenomegaly.  Moderate sized hiatal hernia.  Portal hypertension including upper abdominal varices. -CT abdomen pelvis with IV contrast 11/12/2024-cirrhotic liver.  Extensive colonic diverticulosis.  Diffuse moderate wall thickening of the sigmoid colon without focal inflammation around the multiple sigmoid colon diverticula.  Additional severe wall thickening and surrounding fat stranding in the ascending colon. -colonoscopy 11/13/24- 2 sessile polyps removed.  Diverticula found in the sigmoid. Internal nonbleeding hemorrhoids. -colon biopsies colonoscopy ascending polyps-inflammatory polyps. Random colon biopsies no microscopic colitis. -EGD 11/13/2024- LA grade C esophagitis with no bleeding found in the lower third of the esophagus.  Large hiatal hernia present. Mild erythematous nonbleeding gastric antrum.  Duodenum appeared normal.  No esophageal varices or gastric varices noted. -biopsies EGD- esophagus with ulceration and acute inflammation. Gastric biopsies' no H. pylori- -Labs 11/14/2024-creatinine 0.69, sodium 138, potassium 3.9, total bilirubin 1.5, ALT 17, AST 24, alk phos 115, hemoglobin 12.5, platelets 62, INR 1.3 WBC 5.3  Today - Moved here from [place] 2 weeks ago; Moved to assisted living: [facility] - Diagnosed with HCV 10 years ago - completed treatment; Thinks progressed to cirrhosis 4-5 years ago - Was not seeing hepatologist in [place] per patient, PCP was managing cirrhosis - Per patient has been hospitalized for decompensated cirrhosis about 4-5 times - In October was hospitalized for decompensated cirrhosis with HE and then 11/2024 - Started Xifaxan and lactulose 1 year ago - Swallowing much better on pantoprazole 40 mg and sucralfate QD - Having a lot of confusion and delayed thought process currently: taking Xifaxan bid and lactulose once a day - Only taking lactulose once a day - does not take as often because of diarrhea - Having increased abdominal ascites; last paracentesis 3 years ago - Stopped all ETOH 3 months ago; was drinking about 9 glasses per week - Supplements: vitamin b, d, k, multi, protein powder - States eats well - BM1 per day - Stools soft

## 2024-11-29 LAB
ALBUMIN: 3.9
ALKALINE PHOSPHATASE: 151
ALT (SGPT): 20
AST (SGOT): 31
BASO (ABSOLUTE): 0
BASOS: 0
BILIRUBIN, TOTAL: 1.8
BUN/CREATININE RATIO: 19
BUN: 16
CALCIUM: 9.8
CARBON DIOXIDE, TOTAL: 23
CHLORIDE: 100
CREATININE: 0.84
EGFR: 74
EOS (ABSOLUTE): 0.4
EOS: 6
GLOBULIN, TOTAL: 3.1
GLUCOSE: 274
HBSAG SCREEN: NEGATIVE
HEMATOCRIT: 43.6
HEMATOLOGY COMMENTS:: (no result)
HEMOGLOBIN: 14.6
HEP A AB, IGM: NEGATIVE
HEP A AB, TOTAL: POSITIVE
HEP B CORE AB, TOT: POSITIVE
HEPATITIS B SURF AB QUANT: 7.7
IMMATURE CELLS: (no result)
IMMATURE GRANS (ABS): (no result)
IMMATURE GRANULOCYTES: (no result)
INR: (no result)
LYMPHS (ABSOLUTE): 1.2
LYMPHS: 18
MAGNESIUM: 1.7
MCH: 33.4
MCHC: 33.5
MCV: 100
MONOCYTES(ABSOLUTE): 0.3
MONOCYTES: 5
NEUTROPHILS (ABSOLUTE): 4.8
NEUTROPHILS: 71
NRBC: (no result)
PLATELETS: 74
POTASSIUM: 3.6
PROTEIN, TOTAL: 7
PROTHROMBIN TIME: (no result)
RBC: 4.37
RDW: 14
REQUEST PROBLEM: (no result)
SODIUM: 138
WBC: 6.8

## (undated) DEVICE — KIT ENDOKIT COMPLIANCE CUSTOM